# Patient Record
Sex: FEMALE | Race: WHITE | NOT HISPANIC OR LATINO | Employment: OTHER | URBAN - METROPOLITAN AREA
[De-identification: names, ages, dates, MRNs, and addresses within clinical notes are randomized per-mention and may not be internally consistent; named-entity substitution may affect disease eponyms.]

---

## 2017-03-06 ENCOUNTER — TRANSCRIBE ORDERS (OUTPATIENT)
Dept: ADMINISTRATIVE | Facility: HOSPITAL | Age: 57
End: 2017-03-06

## 2017-03-06 DIAGNOSIS — Z12.31 VISIT FOR SCREENING MAMMOGRAM: Primary | ICD-10-CM

## 2017-04-13 ENCOUNTER — HOSPITAL ENCOUNTER (OUTPATIENT)
Dept: BONE DENSITY | Facility: IMAGING CENTER | Age: 57
Discharge: HOME | End: 2017-04-13
Payer: COMMERCIAL

## 2017-04-13 DIAGNOSIS — Z12.31 VISIT FOR SCREENING MAMMOGRAM: ICD-10-CM

## 2017-04-13 PROCEDURE — G0202 SCR MAMMO BI INCL CAD: HCPCS

## 2017-08-11 ENCOUNTER — GENERIC CONVERSION - ENCOUNTER (OUTPATIENT)
Dept: OTHER | Facility: OTHER | Age: 57
End: 2017-08-11

## 2017-08-24 ENCOUNTER — GENERIC CONVERSION - ENCOUNTER (OUTPATIENT)
Dept: OTHER | Facility: OTHER | Age: 57
End: 2017-08-24

## 2017-08-25 ENCOUNTER — GENERIC CONVERSION - ENCOUNTER (OUTPATIENT)
Dept: OTHER | Facility: OTHER | Age: 57
End: 2017-08-25

## 2017-08-25 LAB
A/G RATIO (HISTORICAL): 1.5 (ref 1.2–2.2)
ALBUMIN SERPL BCP-MCNC: 4.1 G/DL (ref 3.5–5.5)
ALP SERPL-CCNC: 89 IU/L (ref 39–117)
ALT SERPL W P-5'-P-CCNC: 13 IU/L (ref 0–32)
AST SERPL W P-5'-P-CCNC: 16 IU/L (ref 0–40)
BASOPHILS # BLD AUTO: 0 X10E3/UL (ref 0–0.2)
BASOPHILS # BLD AUTO: 1 %
BILIRUB SERPL-MCNC: 0.3 MG/DL (ref 0–1.2)
BUN SERPL-MCNC: 13 MG/DL (ref 6–24)
BUN/CREA RATIO (HISTORICAL): 16 (ref 9–23)
CALCIUM SERPL-MCNC: 9.4 MG/DL (ref 8.7–10.2)
CHLORIDE SERPL-SCNC: 102 MMOL/L (ref 96–106)
CO2 SERPL-SCNC: 22 MMOL/L (ref 18–29)
CREAT SERPL-MCNC: 0.79 MG/DL (ref 0.57–1)
DEPRECATED RDW RBC AUTO: 15 % (ref 12.3–15.4)
EGFR AFRICAN AMERICAN (HISTORICAL): 97 ML/MIN/1.73
EGFR-AMERICAN CALC (HISTORICAL): 85 ML/MIN/1.73
EOSINOPHIL # BLD AUTO: 0.3 X10E3/UL (ref 0–0.4)
EOSINOPHIL # BLD AUTO: 5 %
GLUCOSE SERPL-MCNC: 103 MG/DL (ref 65–99)
HCT VFR BLD AUTO: 35 % (ref 34–46.6)
HGB BLD-MCNC: 11.3 G/DL (ref 11.1–15.9)
IMM.GRANULOCYTES (CD4/8) (HISTORICAL): 0 %
IMM.GRANULOCYTES (CD4/8) (HISTORICAL): 0 X10E3/UL (ref 0–0.1)
LYME IGG/IGM AB (HISTORICAL): <0.91 ISR (ref 0–0.9)
LYME IGM (HISTORICAL): <0.8 INDEX (ref 0–0.79)
LYMPHOCYTES # BLD AUTO: 1.5 X10E3/UL (ref 0.7–3.1)
LYMPHOCYTES # BLD AUTO: 26 %
MCH RBC QN AUTO: 28.4 PG (ref 26.6–33)
MCHC RBC AUTO-ENTMCNC: 32.3 G/DL (ref 31.5–35.7)
MCV RBC AUTO: 88 FL (ref 79–97)
MONOCYTES # BLD AUTO: 0.4 X10E3/UL (ref 0.1–0.9)
MONOCYTES (HISTORICAL): 7 %
NEUTROPHILS # BLD AUTO: 3.4 X10E3/UL (ref 1.4–7)
NEUTROPHILS # BLD AUTO: 61 %
PLATELET # BLD AUTO: 326 X10E3/UL (ref 150–379)
POTASSIUM SERPL-SCNC: 4.6 MMOL/L (ref 3.5–5.2)
RBC (HISTORICAL): 3.98 X10E6/UL (ref 3.77–5.28)
SODIUM SERPL-SCNC: 139 MMOL/L (ref 134–144)
TOT. GLOBULIN, SERUM (HISTORICAL): 2.8 G/DL (ref 1.5–4.5)
TOTAL PROTEIN (HISTORICAL): 6.9 G/DL (ref 6–8.5)
TSH SERPL DL<=0.05 MIU/L-ACNC: 0.98 UIU/ML (ref 0.45–4.5)
WBC # BLD AUTO: 5.6 X10E3/UL (ref 3.4–10.8)

## 2017-08-26 ENCOUNTER — GENERIC CONVERSION - ENCOUNTER (OUTPATIENT)
Dept: OTHER | Facility: OTHER | Age: 57
End: 2017-08-26

## 2017-11-20 ENCOUNTER — HOSPITAL ENCOUNTER (OUTPATIENT)
Dept: RADIOLOGY | Facility: HOSPITAL | Age: 57
Discharge: HOME | End: 2017-11-20
Payer: COMMERCIAL

## 2017-11-20 ENCOUNTER — TRANSCRIBE ORDERS (OUTPATIENT)
Dept: ADMINISTRATIVE | Facility: HOSPITAL | Age: 57
End: 2017-11-20

## 2017-11-20 DIAGNOSIS — M25.569 PAIN IN KNEE: ICD-10-CM

## 2017-11-20 PROCEDURE — 73562 X-RAY EXAM OF KNEE 3: CPT

## 2017-11-24 ENCOUNTER — GENERIC CONVERSION - ENCOUNTER (OUTPATIENT)
Dept: OTHER | Facility: OTHER | Age: 57
End: 2017-11-24

## 2017-12-04 ENCOUNTER — GENERIC CONVERSION - ENCOUNTER (OUTPATIENT)
Dept: FAMILY MEDICINE CLINIC | Facility: CLINIC | Age: 57
End: 2017-12-04

## 2018-01-10 NOTE — RESULT NOTES
Discussion/Summary   xrays show arthritis in both knees, more on left     Verified Results  * XR KNEE 3 VW RIGHT NON INJURY 20Nov2017 06:04PM Diya Colmenares Order Number: [id number]     Test Name Result Flag Reference   XR KNEE 3 VW RIGHT (Report)     RIGHT KNEE     INDICATION: Diffuse right knee pain  Bilateral knee pain  COMPARISON: Concurrently performed radiographs of the contralateral knee  VIEWS: 3     IMAGES: 3     FINDINGS:     There is no acute fracture or dislocation  There is no joint effusion  Mild to moderate tricompartmental right knee osteoarthritis as evidenced by joint space narrowing and osteophytosis  No lytic or blastic lesions are seen  Soft tissues are unremarkable  IMPRESSION:     No acute osseous abnormality  Mild to moderate tricompartmental right knee osteoarthritis  Osteoarthritis is slightly more severe on the left than on the right         Workstation performed: [id number]     Signed by:   Valerie Kearney MD   11/24/17

## 2018-01-10 NOTE — RESULT NOTES
Verified Results  [facility]) UA/M w/rflx Culture, Comp [id number] 12:00AM Olga Clement     Test Name Result Flag Reference   Specific Gravity 1 009  1 005-1 030   pH 6 0  5 0-7 5   Urine-Color Yellow  Yellow   Appearance Clear  Clear   WBC Esterase 2+ A Negative   Protein 1+ A Negative/Trace   Glucose Negative  Negative   Ketones Negative  Negative   Occult Blood 3+ A Negative   Bilirubin Negative  Negative   Urobilinogen,Semi-Qn 0 2 EU/dL  0 2-1 0   Nitrite, Urine Negative  Negative   Microscopic Examination See below:     Urinalysis Reflex Comment     This specimen has reflexed to a Urine Culture  WBC 11-30 /hpf A 0 -  5   RBC 11-30 /hpf A 0 -  2   Epithelial Cells (non renal) 0-10 /hpf  0 - 10   Mucus Threads Present  Not Estab     Bacteria Few  None seen/Few   Result 1 Escherichia coli A    25,000-50,000 colony forming units per mL   Urine Culture,Comprehensive Final report A    Antimicrobial Susceptibility Comment     ** S = Susceptible; I = Intermediate; R = Resistant **                     P = Positive; N = Negative              MICS are expressed in micrograms per mL     Antibiotic                 RSLT#1    RSLT#2    RSLT#3    RSLT#4  Amoxicillin/Clavulanic Acid    S  Ampicillin                     S  Cefepime                       S  Ceftriaxone                    S  Cefuroxime                     S  Cephalothin                    S  Ciprofloxacin                  S  Ertapenem                      S  Gentamicin                     S  Imipenem                       S  Levofloxacin                   S  Nitrofurantoin                 S  Piperacillin                   S  Tetracycline                   S  Tobramycin                     S  Trimethoprim/Sulfa             S

## 2018-01-11 NOTE — RESULT NOTES
Discussion/Summary   labs are normal but sugar is slightly high  watch carbs     Verified Results  (1) CBC/PLT/DIFF [id number] 12:00AM Mary Conyac     Test Name Result Flag Reference   WBC 5 6 x10E3/uL  3 4-10 8   RBC 3 98 x10E6/uL  3 77-5 28   Hemoglobin 11 3 g/dL  11 1-15 9   Hematocrit 35 0 %  34 0-46  6   MCV 88 fL  79-97   MCH 28 4 pg  26 6-33 0   MCHC 32 3 g/dL  31 5-35 7   RDW 15 0 %  12 3-15 4   Platelets 380 X07I6/HV  150-379   Neutrophils 61 %     Lymphs 26 %     Monocytes 7 %     Eos 5 %     Basos 1 %     Neutrophils (Absolute) 3 4 x10E3/uL  1 4-7 0   Lymphs (Absolute) 1 5 x10E3/uL  0 7-3 1   Monocytes(Absolute) 0 4 x10E3/uL  0 1-0 9   Eos (Absolute) 0 3 x10E3/uL  0 0-0 4   Baso (Absolute) 0 0 x10E3/uL  0 0-0 2   Immature Granulocytes 0 %     Immature Grans (Abs) 0 0 x10E3/uL  0 0-0 1     (1) COMPREHENSIVE METABOLIC PANEL [id number] 52:20RZ MaryFritter     Test Name Result Flag Reference   Glucose, Serum 103 mg/dL H 65-99   BUN 13 mg/dL  6-24   Creatinine, Serum 0 79 mg/dL  0 57-1 00   BUN/Creatinine Ratio 16  9-23   Sodium, Serum 139 mmol/L  134-144   Potassium, Serum 4 6 mmol/L  3 5-5 2   Chloride, Serum 102 mmol/L     Carbon Dioxide, Total 22 mmol/L  18-29   Calcium, Serum 9 4 mg/dL  8 7-10 2   Protein, Total, Serum 6 9 g/dL  6 0-8 5   Albumin, Serum 4 1 g/dL  3 5-5 5   Globulin, Total 2 8 g/dL  1 5-4 5   A/G Ratio 1 5  1 2-2 2   Bilirubin, Total 0 3 mg/dL  0 0-1 2   Alkaline Phosphatase, S 89 IU/L     AST (SGOT) 16 IU/L  0-40   ALT (SGPT) 13 IU/L  0-32   eGFR If NonAfricn Am 85 mL/min/1 73  >59   eGFR If Africn Am 97 mL/min/1 73  >59     (1) TSH [id number] 12:00AM Mary Romero     Test Name Result Flag Reference   TSH 0 979 uIU/mL  0 450-4 500

## 2018-01-16 NOTE — RESULT NOTES
Discussion/Summary   lyme is negative     Verified Results  (LC) Lyme Ab/Western Blot Reflex [id number] 12:00AM Lia Hope     Test Name Result Flag Reference   Lyme IgG/IgM Ab <0 91 ISR  0 00-0 90   Negative         <0 91                                                 Equivocal  0 91 - 1 09                                                 Positive         >1 09   Lyme Disease Ab, Quant, IgM <0 80 index  0 00-0 79   Negative         <0 80                                                 Equivocal  0 80 - 1 19                                                 Positive         >1 19                  IgM levels may peak at 3-6 weeks post infection, then                  gradually decline

## 2018-01-18 NOTE — RESULT NOTES
Message   please check on blood work, are they running labs     Verified Results  [facility]) Please note [id number] 12:00AM Dorthea Batch     Test Name Result Flag Reference   Please note Comment     The date and/or time of collection was not indicated on the  requisition as required by state and federal law  The date of  receipt of the specimen was used as the collection date if not  supplied

## 2018-03-15 ENCOUNTER — TRANSCRIBE ORDERS (OUTPATIENT)
Dept: ADMINISTRATIVE | Facility: HOSPITAL | Age: 58
End: 2018-03-15

## 2018-03-15 DIAGNOSIS — Z12.39 SCREENING BREAST EXAMINATION: Primary | ICD-10-CM

## 2018-04-16 ENCOUNTER — HOSPITAL ENCOUNTER (OUTPATIENT)
Dept: BONE DENSITY | Facility: IMAGING CENTER | Age: 58
Discharge: HOME | End: 2018-04-16
Payer: COMMERCIAL

## 2018-04-16 DIAGNOSIS — Z12.39 SCREENING BREAST EXAMINATION: ICD-10-CM

## 2018-04-16 PROCEDURE — 77067 SCR MAMMO BI INCL CAD: CPT

## 2019-03-27 ENCOUNTER — TRANSCRIBE ORDERS (OUTPATIENT)
Dept: ADMINISTRATIVE | Facility: HOSPITAL | Age: 59
End: 2019-03-27

## 2019-03-27 DIAGNOSIS — Z12.39 SCREENING BREAST EXAMINATION: Primary | ICD-10-CM

## 2019-04-18 ENCOUNTER — HOSPITAL ENCOUNTER (OUTPATIENT)
Dept: BONE DENSITY | Facility: IMAGING CENTER | Age: 59
Discharge: HOME | End: 2019-04-18
Payer: COMMERCIAL

## 2019-04-18 VITALS — BODY MASS INDEX: 24.88 KG/M2 | WEIGHT: 168 LBS | HEIGHT: 69 IN

## 2019-04-18 DIAGNOSIS — Z12.39 SCREENING BREAST EXAMINATION: ICD-10-CM

## 2019-04-18 PROCEDURE — 77063 BREAST TOMOSYNTHESIS BI: CPT

## 2019-04-18 PROCEDURE — 77067 SCR MAMMO BI INCL CAD: CPT

## 2020-03-10 DIAGNOSIS — Z13.220 ENCOUNTER FOR LIPID SCREENING FOR CARDIOVASCULAR DISEASE: Primary | ICD-10-CM

## 2020-03-10 DIAGNOSIS — Z13.29 SCREENING FOR ENDOCRINE DISORDER: ICD-10-CM

## 2020-03-10 DIAGNOSIS — Z13.0 SCREENING FOR IRON DEFICIENCY ANEMIA: ICD-10-CM

## 2020-03-10 DIAGNOSIS — Z13.6 ENCOUNTER FOR LIPID SCREENING FOR CARDIOVASCULAR DISEASE: Primary | ICD-10-CM

## 2020-03-13 LAB
ALBUMIN SERPL-MCNC: 4.4 G/DL (ref 3.8–4.9)
ALBUMIN/GLOB SERPL: 1.8 {RATIO} (ref 1.2–2.2)
ALP SERPL-CCNC: 83 IU/L (ref 39–117)
ALT SERPL-CCNC: 15 IU/L (ref 0–32)
AST SERPL-CCNC: 19 IU/L (ref 0–40)
BASOPHILS # BLD AUTO: 0.1 X10E3/UL (ref 0–0.2)
BASOPHILS NFR BLD AUTO: 2 %
BILIRUB SERPL-MCNC: 0.3 MG/DL (ref 0–1.2)
BUN SERPL-MCNC: 12 MG/DL (ref 6–24)
BUN/CREAT SERPL: 14 (ref 9–23)
CALCIUM SERPL-MCNC: 9.3 MG/DL (ref 8.7–10.2)
CHLORIDE SERPL-SCNC: 104 MMOL/L (ref 96–106)
CHOLEST SERPL-MCNC: 187 MG/DL (ref 100–199)
CO2 SERPL-SCNC: 24 MMOL/L (ref 20–29)
CREAT SERPL-MCNC: 0.83 MG/DL (ref 0.57–1)
EOSINOPHIL # BLD AUTO: 0.4 X10E3/UL (ref 0–0.4)
EOSINOPHIL NFR BLD AUTO: 7 %
ERYTHROCYTE [DISTWIDTH] IN BLOOD BY AUTOMATED COUNT: 14 % (ref 11.7–15.4)
GLOBULIN SER-MCNC: 2.4 G/DL (ref 1.5–4.5)
GLUCOSE SERPL-MCNC: 96 MG/DL (ref 65–99)
HCT VFR BLD AUTO: 39.4 % (ref 34–46.6)
HDLC SERPL-MCNC: 81 MG/DL
HGB BLD-MCNC: 13.4 G/DL (ref 11.1–15.9)
IMM GRANULOCYTES # BLD: 0 X10E3/UL (ref 0–0.1)
IMM GRANULOCYTES NFR BLD: 0 %
LDLC SERPL CALC-MCNC: 86 MG/DL (ref 0–99)
LDLC/HDLC SERPL: 1.1 RATIO (ref 0–3.2)
LYMPHOCYTES # BLD AUTO: 1.9 X10E3/UL (ref 0.7–3.1)
LYMPHOCYTES NFR BLD AUTO: 35 %
MCH RBC QN AUTO: 30 PG (ref 26.6–33)
MCHC RBC AUTO-ENTMCNC: 34 G/DL (ref 31.5–35.7)
MCV RBC AUTO: 88 FL (ref 79–97)
MONOCYTES # BLD AUTO: 0.4 X10E3/UL (ref 0.1–0.9)
MONOCYTES NFR BLD AUTO: 7 %
NEUTROPHILS # BLD AUTO: 2.7 X10E3/UL (ref 1.4–7)
NEUTROPHILS NFR BLD AUTO: 49 %
PLATELET # BLD AUTO: 314 X10E3/UL (ref 150–450)
POTASSIUM SERPL-SCNC: 4.4 MMOL/L (ref 3.5–5.2)
PROT SERPL-MCNC: 6.8 G/DL (ref 6–8.5)
RBC # BLD AUTO: 4.47 X10E6/UL (ref 3.77–5.28)
SL AMB EGFR AFRICAN AMERICAN: 91 ML/MIN/1.73
SL AMB EGFR NON AFRICAN AMERICAN: 79 ML/MIN/1.73
SL AMB VLDL CHOLESTEROL CALC: 20 MG/DL (ref 5–40)
SODIUM SERPL-SCNC: 141 MMOL/L (ref 134–144)
TRIGL SERPL-MCNC: 98 MG/DL (ref 0–149)
TSH SERPL DL<=0.005 MIU/L-ACNC: 0.8 UIU/ML (ref 0.45–4.5)
WBC # BLD AUTO: 5.4 X10E3/UL (ref 3.4–10.8)

## 2020-03-16 ENCOUNTER — OFFICE VISIT (OUTPATIENT)
Dept: FAMILY MEDICINE CLINIC | Facility: CLINIC | Age: 60
End: 2020-03-16
Payer: COMMERCIAL

## 2020-03-16 VITALS
TEMPERATURE: 97.8 F | RESPIRATION RATE: 16 BRPM | DIASTOLIC BLOOD PRESSURE: 75 MMHG | SYSTOLIC BLOOD PRESSURE: 105 MMHG | OXYGEN SATURATION: 98 % | HEIGHT: 69 IN | HEART RATE: 76 BPM | WEIGHT: 170 LBS | BODY MASS INDEX: 25.18 KG/M2

## 2020-03-16 DIAGNOSIS — Z12.39 SCREENING FOR BREAST CANCER: ICD-10-CM

## 2020-03-16 DIAGNOSIS — B35.4 TINEA CORPORIS: ICD-10-CM

## 2020-03-16 DIAGNOSIS — Z00.00 WELL ADULT EXAM: Primary | ICD-10-CM

## 2020-03-16 DIAGNOSIS — Z12.11 COLON CANCER SCREENING: ICD-10-CM

## 2020-03-16 PROBLEM — M17.9 OSTEOARTHRITIS OF KNEE: Status: ACTIVE | Noted: 2020-03-16

## 2020-03-16 PROBLEM — Z90.710 HX OF TOTAL HYSTERECTOMY: Status: ACTIVE | Noted: 2020-03-16

## 2020-03-16 PROBLEM — M17.10 OSTEOARTHRITIS OF KNEE: Status: ACTIVE | Noted: 2020-03-16

## 2020-03-16 PROBLEM — A69.20 LYME DISEASE: Status: ACTIVE | Noted: 2017-08-24

## 2020-03-16 PROCEDURE — 99386 PREV VISIT NEW AGE 40-64: CPT | Performed by: FAMILY MEDICINE

## 2020-03-16 PROCEDURE — 3008F BODY MASS INDEX DOCD: CPT | Performed by: FAMILY MEDICINE

## 2020-03-16 RX ORDER — NYSTATIN 100000 U/G
CREAM TOPICAL 2 TIMES DAILY
Qty: 30 G | Refills: 0 | Status: SHIPPED | OUTPATIENT
Start: 2020-03-16

## 2020-03-16 NOTE — PROGRESS NOTES
Jeremiah Albarado is a 64 y o   female and is here for routine health maintenance  The patient reports no problems  History of Present Illness     Patient is here for physical today  She denies any complaints or changes in her medical history since our last office visit about 4 years ago  She denies any recent illnesses  Her bowel movements are normal   Her last colonoscopy was 7 years ago and she is due for recheck  She had a gyn visit and was given a prescription for her mammogram   She is on no medicines on a regular basis  She is exercising 3 days a week, spinning and has helped with her knee pain  She also does weight training 2 days a week  She denies any chest pains or shortness of breath  She is eating healthy  Well Adult Physical   Patient here for a comprehensive physical exam       Diet and Physical Activity  Diet: well balanced diet  Weight concerns: Patient is overweight (BMI 25 0-29  9)  Exercise: frequently      Depression Screen  PHQ-9 Depression Screening    PHQ-9:    Frequency of the following problems over the past two weeks:       Little interest or pleasure in doing things:  0 - not at all  Feeling down, depressed, or hopeless:  0 - not at all  PHQ-2 Score:  0          General Health  Hearing: Normal:  bilateral  Vision: no vision problems  Dental: regular dental visits     History:  LMP: No LMP recorded  Patient has had a hysterectomy        Cancer Screening  Colononoscopy up to date but due 4/2020  Mammogram due 4/2020  Pap N/A history of hysterectomy   Abnormal pap? no  Smoker NO Annual screening with low-dose helical computed tomography (CT) for patients age 54 to 76 years with history of smoking at least 30 pack-years and, if a former smoker, had quit within the previous 15 years      The following portions of the patient's history were reviewed and updated as appropriate: allergies, current medications, past family history, past medical history, past social history, past surgical history and problem list     Review of Systems     Review of Systems   Constitutional: Negative  Negative for fatigue and fever  HENT: Negative  Eyes: Negative  Respiratory: Negative  Negative for cough  Cardiovascular: Negative  Gastrointestinal: Negative  Endocrine: Negative  Genitourinary: Negative  Musculoskeletal: Negative  Skin: Positive for rash  Rash left ankle for a few months    Allergic/Immunologic: Negative  Neurological: Negative  Psychiatric/Behavioral: Negative  Past Medical History     History reviewed  No pertinent past medical history      Past Surgical History     Past Surgical History:   Procedure Laterality Date    BREAST EXCISIONAL BIOPSY Left 1993    benign    HYSTERECTOMY      age 50    OOPHORECTOMY Bilateral     with hysterectomy, age 48       Social History     Social History     Socioeconomic History    Marital status: /Civil Union     Spouse name: None    Number of children: None    Years of education: None    Highest education level: None   Occupational History    None   Social Needs    Financial resource strain: None    Food insecurity:     Worry: None     Inability: None    Transportation needs:     Medical: None     Non-medical: None   Tobacco Use    Smoking status: Former Smoker    Smokeless tobacco: Never Used   Substance and Sexual Activity    Alcohol use: None    Drug use: None    Sexual activity: None   Lifestyle    Physical activity:     Days per week: None     Minutes per session: None    Stress: None   Relationships    Social connections:     Talks on phone: None     Gets together: None     Attends Evangelical service: None     Active member of club or organization: None     Attends meetings of clubs or organizations: None     Relationship status: None    Intimate partner violence:     Fear of current or ex partner: None     Emotionally abused: None     Physically abused: None Forced sexual activity: None   Other Topics Concern    None   Social History Narrative    None       Family History     Family History   Problem Relation Age of Onset    Endometrial cancer Paternal Grandmother         age unknown       Current Medications       Current Outpatient Medications:     nystatin (MYCOSTATIN) cream, Apply topically 2 (two) times a day, Disp: 30 g, Rfl: 0     Allergies     No Known Allergies    Objective     /75   Pulse 76   Temp 97 8 °F (36 6 °C)   Resp 16   Ht 5' 9" (1 753 m)   Wt 77 1 kg (170 lb)   SpO2 98%   BMI 25 10 kg/m²      Physical Exam   Constitutional: She is oriented to person, place, and time  She appears well-developed and well-nourished  HENT:   Head: Normocephalic  Right Ear: External ear normal    Left Ear: External ear normal    Nose: Nose normal    Mouth/Throat: Oropharynx is clear and moist    Eyes: Pupils are equal, round, and reactive to light  Conjunctivae are normal    Neck: Normal range of motion  Neck supple  Cardiovascular: Normal rate, regular rhythm, normal heart sounds and intact distal pulses  Pulmonary/Chest: Effort normal and breath sounds normal    Abdominal: Soft  Bowel sounds are normal    Neurological: She is alert and oriented to person, place, and time  Skin: Skin is warm and dry  Rash noted  Left ankle: raised erythematous area 1 5cm, raised border  Psychiatric: She has a normal mood and affect  Her behavior is normal  Judgment and thought content normal    Nursing note and vitals reviewed  BMI Counseling: Body mass index is 25 1 kg/m²  The BMI is above normal  Nutrition recommendations include reducing portion sizes  Exercise recommendations include exercising 3-5 times per week    No exam data present    Health Maintenance     Health Maintenance   Topic Date Due    Hepatitis C Screening  1960    Depression Screening PHQ  1960    HIV Screening  06/07/1977    BMI: Followup Plan  06/07/1980    Annual Physical  06/07/1980    Influenza Vaccine  07/01/2019    MAMMOGRAM  04/18/2020    CRC Screening: Colonoscopy  04/22/2020    BMI: Adult  03/16/2021    DTaP,Tdap,and Td Vaccines (2 - Td) 08/11/2021    Pneumococcal Vaccine: 65+ Years (1 of 2 - PCV13) 06/07/2027    Pneumococcal Vaccine: Pediatrics (0 to 5 Years) and At-Risk Patients (6 to 59 Years)  Aged Out    HIB Vaccine  Aged Out    Hepatitis B Vaccine  Aged Out    IPV Vaccine  Aged Out    Hepatitis A Vaccine  Aged Out    Meningococcal ACWY Vaccine  Aged Out    HPV Vaccine  Aged [employer] History   Administered Date(s) Administered    Tdap 08/11/2011       Assessment/Plan         1  Healthy female exam   2  Patient Counseling:   · Nutrition: Stressed importance of a well balanced diet, moderation of sodium/saturated fat, caloric balance and sufficient intake of fiber  · Exercise: Stressed the importance of regular exercise with a goal of 150 minutes per week  · Dental Health: Discussed daily flossing and brushing and regular dental visits     · Immunizations reviewed  · Discussed benefits of screening   · Discussed the patient's BMI with her  The BMI is above average; BMI management plan is completed  3  Cancer Screening   4  Labs   5    6  Follow up in one year      Gianluca Estrella DO

## 2020-03-16 NOTE — PATIENT INSTRUCTIONS
Schedule mammogram in April   Schedule colonoscopy in April   Nystatin cream apply twice a day,     Wellness Visit for Adults   AMBULATORY CARE:   A wellness visit  is when you see your healthcare provider to get screened for health problems  You can also get advice on how to stay healthy  Write down your questions so you remember to ask them  Ask your healthcare provider how often you should have a wellness visit  What happens at a wellness visit:  Your healthcare provider will ask about your health, and your family history of health problems  This includes high blood pressure, heart disease, and cancer  He or she will ask if you have symptoms that concern you, if you smoke, and about your mood  You may also be asked about your intake of medicines, supplements, food, and alcohol  Any of the following may be done:  · Your weight  will be checked  Your height may also be checked so your body mass index (BMI) can be calculated  Your BMI shows if you are at a healthy weight  · Your blood pressure  and heart rate will be checked  Your temperature may also be checked  · Blood and urine tests  may be done  Blood tests may be done to check your cholesterol levels  Abnormal cholesterol levels increase your risk for heart disease and stroke  You may also need a blood or urine test to check for diabetes if you are at increased risk  Urine tests may be done to look for signs of an infection or kidney disease  · A physical exam  includes checking your heartbeat and lungs with a stethoscope  Your healthcare provider may also check your skin to look for sun damage  · Screening tests  may be recommended  A screening test is done to check for diseases that may not cause symptoms  The screening tests you may need depend on your age, gender, family history, and lifestyle habits  For example, colorectal screening may be recommended if you are 48years old or older    Screening tests you need if you are a woman:   · A Pap smear  is used to screen for cervical cancer  Pap smears are usually done every 3 to 5 years depending on your age  You may need them more often if you have had abnormal Pap smear test results in the past  Ask your healthcare provider how often you should have a Pap smear  · A mammogram  is an x-ray of your breasts to screen for breast cancer  Experts recommend mammograms every 2 years starting at age 48 years  You may need a mammogram at age 52 years or younger if you have an increased risk for breast cancer  Talk to your healthcare provider about when you should start having mammograms and how often you need them  Vaccines you may need:   · Get an influenza vaccine  every year  The influenza vaccine protects you from the flu  Several types of viruses cause the flu  The viruses change over time, so new vaccines are made each year  · Get a tetanus-diphtheria (Td) booster vaccine  every 10 years  This vaccine protects you against tetanus and diphtheria  Tetanus is a severe infection that may cause painful muscle spasms and lockjaw  Diphtheria is a severe bacterial infection that causes a thick covering in the back of your mouth and throat  · Get a human papillomavirus (HPV) vaccine  if you are female and aged 23 to 32 or male 23 to 24 and never received it  This vaccine protects you from HPV infection  HPV is the most common infection spread by sexual contact  HPV may also cause vaginal, penile, and anal cancers  · Get a pneumococcal vaccine  if you are aged 72 years or older  The pneumococcal vaccine is an injection given to protect you from pneumococcal disease  Pneumococcal disease is an infection caused by pneumococcal bacteria  The infection may cause pneumonia, meningitis, or an ear infection  · Get a shingles vaccine  if you are aged 61 or older, even if you have had shingles before  The shingles vaccine is an injection to protect you from the varicella-zoster virus   This is the same virus that causes chickenpox  Shingles is a painful rash that develops in people who had chickenpox or have been exposed to the virus  How to eat healthy:  My Plate is a model for planning healthy meals  It shows the types and amounts of foods that should go on your plate  Fruits and vegetables make up about half of your plate, and grains and protein make up the other half  A serving of dairy is included on the side of your plate  The amount of calories and serving sizes you need depends on your age, gender, weight, and height  Examples of healthy foods are listed below:  · Eat a variety of vegetables  such as dark green, red, and orange vegetables  You can also include canned vegetables low in sodium (salt) and frozen vegetables without added butter or sauces  · Eat a variety of fresh fruits , canned fruit in 100% juice, frozen fruit, and dried fruit  · Include whole grains  At least half of the grains you eat should be whole grains  Examples include whole-wheat bread, wheat pasta, brown rice, and whole-grain cereals such as oatmeal     · Eat a variety of protein foods such as seafood (fish and shellfish), lean meat, and poultry without skin (turkey and chicken)  Examples of lean meats include pork leg, shoulder, or tenderloin, and beef round, sirloin, tenderloin, and extra lean ground beef  Other protein foods include eggs and egg substitutes, beans, peas, soy products, nuts, and seeds  · Choose low-fat dairy products such as skim or 1% milk or low-fat yogurt, cheese, and cottage cheese  · Limit unhealthy fats  such as butter, hard margarine, and shortening  Exercise:  Exercise at least 30 minutes per day on most days of the week  Some examples of exercise include walking, biking, dancing, and swimming  You can also fit in more physical activity by taking the stairs instead of the elevator or parking farther away from stores  Include muscle strengthening activities 2 days each week   Regular exercise provides many health benefits  It helps you manage your weight, and decreases your risk for type 2 diabetes, heart disease, stroke, and high blood pressure  Exercise can also help improve your mood  Ask your healthcare provider about the best exercise plan for you  General health and safety guidelines:   · Do not smoke  Nicotine and other chemicals in cigarettes and cigars can cause lung damage  Ask your healthcare provider for information if you currently smoke and need help to quit  E-cigarettes or smokeless tobacco still contain nicotine  Talk to your healthcare provider before you use these products  · Limit alcohol  A drink of alcohol is 12 ounces of beer, 5 ounces of wine, or 1½ ounces of liquor  · Lose weight, if needed  Being overweight increases your risk of certain health conditions  These include heart disease, high blood pressure, type 2 diabetes, and certain types of cancer  · Protect your skin  Do not sunbathe or use tanning beds  Use sunscreen with a SPF 15 or higher  Apply sunscreen at least 15 minutes before you go outside  Reapply sunscreen every 2 hours  Wear protective clothing, hats, and sunglasses when you are outside  · Drive safely  Always wear your seatbelt  Make sure everyone in your car wears a seatbelt  A seatbelt can save your life if you are in an accident  Do not use your cell phone when you are driving  This could distract you and cause an accident  Pull over if you need to make a call or send a text message  · Practice safe sex  Use latex condoms if are sexually active and have more than one partner  Your healthcare provider may recommend screening tests for sexually transmitted infections (STIs)  · Wear helmets, lifejackets, and protective gear  Always wear a helmet when you ride a bike or motorcycle, go skiing, or play sports that could cause a head injury  Wear protective equipment when you play sports   Wear a lifejacket when you are on a boat or doing water sports  © 2017 [facility] Information is for End User's use only and may not be sold, redistributed or otherwise used for commercial purposes  All illustrations and images included in CareNotes® are the copyrighted property of A D A M [employer] Inc  or Kevan Linares  The above information is an  only  It is not intended as medical advice for individual conditions or treatments  Talk to your doctor, nurse or pharmacist before following any medical regimen to see if it is safe and effective for you

## 2020-04-23 ENCOUNTER — TRANSCRIBE ORDERS (OUTPATIENT)
Dept: ADMINISTRATIVE | Facility: HOSPITAL | Age: 60
End: 2020-04-23

## 2020-04-23 DIAGNOSIS — Z12.31 ENCOUNTER FOR SCREENING MAMMOGRAM FOR MALIGNANT NEOPLASM OF BREAST: Primary | ICD-10-CM

## 2020-05-21 ENCOUNTER — HOSPITAL ENCOUNTER (OUTPATIENT)
Dept: BONE DENSITY | Facility: IMAGING CENTER | Age: 60
Discharge: HOME | End: 2020-05-21
Payer: COMMERCIAL

## 2020-05-21 VITALS — HEIGHT: 69 IN | BODY MASS INDEX: 25.18 KG/M2 | WEIGHT: 170 LBS

## 2020-05-21 DIAGNOSIS — Z12.31 ENCOUNTER FOR SCREENING MAMMOGRAM FOR MALIGNANT NEOPLASM OF BREAST: ICD-10-CM

## 2020-05-21 PROCEDURE — 77067 SCR MAMMO BI INCL CAD: CPT

## 2020-05-21 PROCEDURE — 77063 BREAST TOMOSYNTHESIS BI: CPT

## 2020-06-02 LAB — EXT SARS-COV-2: NOT DETECTED

## 2021-04-26 ENCOUNTER — OFFICE VISIT (OUTPATIENT)
Dept: FAMILY MEDICINE CLINIC | Facility: CLINIC | Age: 61
End: 2021-04-26
Payer: COMMERCIAL

## 2021-04-26 ENCOUNTER — HOSPITAL ENCOUNTER (OUTPATIENT)
Dept: RADIOLOGY | Facility: HOSPITAL | Age: 61
Discharge: HOME | End: 2021-04-26
Payer: COMMERCIAL

## 2021-04-26 VITALS
WEIGHT: 172 LBS | TEMPERATURE: 97.5 F | BODY MASS INDEX: 25.48 KG/M2 | SYSTOLIC BLOOD PRESSURE: 114 MMHG | OXYGEN SATURATION: 99 % | HEIGHT: 69 IN | HEART RATE: 107 BPM | DIASTOLIC BLOOD PRESSURE: 78 MMHG

## 2021-04-26 DIAGNOSIS — Z00.00 WELL ADULT EXAM: Primary | ICD-10-CM

## 2021-04-26 DIAGNOSIS — M25.462 EFFUSION OF LEFT KNEE: ICD-10-CM

## 2021-04-26 DIAGNOSIS — Z13.29 SCREENING FOR ENDOCRINE DISORDER: ICD-10-CM

## 2021-04-26 DIAGNOSIS — Z13.220 ENCOUNTER FOR LIPID SCREENING FOR CARDIOVASCULAR DISEASE: ICD-10-CM

## 2021-04-26 DIAGNOSIS — Z13.6 ENCOUNTER FOR LIPID SCREENING FOR CARDIOVASCULAR DISEASE: ICD-10-CM

## 2021-04-26 DIAGNOSIS — M79.672 LEFT FOOT PAIN: ICD-10-CM

## 2021-04-26 DIAGNOSIS — Z13.0 SCREENING FOR IRON DEFICIENCY ANEMIA: ICD-10-CM

## 2021-04-26 DIAGNOSIS — Z13.29 SCREENING FOR THYROID DISORDER: ICD-10-CM

## 2021-04-26 DIAGNOSIS — Z11.59 NEED FOR HEPATITIS C SCREENING TEST: ICD-10-CM

## 2021-04-26 DIAGNOSIS — Z12.31 ENCOUNTER FOR SCREENING MAMMOGRAM FOR MALIGNANT NEOPLASM OF BREAST: ICD-10-CM
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
## 2021-04-26 PROCEDURE — 73630 X-RAY EXAM OF FOOT: CPT

## 2021-04-26 PROCEDURE — 99213 OFFICE O/P EST LOW 20 MIN: CPT | Performed by: FAMILY MEDICINE

## 2021-04-26 PROCEDURE — 3725F SCREEN DEPRESSION PERFORMED: CPT | Performed by: FAMILY MEDICINE

## 2021-04-26 PROCEDURE — 1036F TOBACCO NON-USER: CPT | Performed by: FAMILY MEDICINE

## 2021-04-26 PROCEDURE — 3008F BODY MASS INDEX DOCD: CPT | Performed by: FAMILY MEDICINE

## 2021-04-26 PROCEDURE — 99396 PREV VISIT EST AGE 40-64: CPT | Performed by: FAMILY MEDICINE

## 2021-04-26 NOTE — PROGRESS NOTES
Assessment/Plan:      1  Well adult exam    2  Injury due to fall, initial encounter  -     XR foot 3+ vw left; Future; Expected date: 04/26/2021    3  Left foot pain  Assessment & Plan:  Xray left foot, r/o fracture distal 4th and 5th metatarsal   If no fracture, rest, cool compresses, elevate, compression knee hi, call if ongoing symptoms    Orders:  -     XR foot 3+ vw left; Future; Expected date: 04/26/2021    4  Effusion of left knee  Assessment & Plan:  Cool compresses, ace wrap, call if unstable      5  Encounter for lipid screening for cardiovascular disease  -     Lipid Panel with Direct LDL reflex; Future    6  Screening for endocrine disorder  -     Comprehensive metabolic panel; Future    7  Screening for iron deficiency anemia  -     CBC and differential; Future    8  Screening for thyroid disorder  -     TSH, 3rd generation with Free T4 reflex; Future    9  Need for hepatitis C screening test  -     Hepatitis C Antibody ([facility]); Future    10  Encounter for screening mammogram for malignant neoplasm of breast        Subjective:  Chief Complaint   Patient presents with    Fall     pt states she fell on Sat, and she hurt her L foot black and blue on top, and her L knee is swelling    Physical Exam     physical done today  bw order placed  Patient ID: Venecia Rouse is a 64 y o  female  Pt states she was exercising- spin class in the am and gardening outside  Then had Leg cramps bilaterally  Fredirick Bakes Directly on left knee and left foot  Saturday 4/24, 2 days ago  Fluids  Took Advil, keeping off of it  Could not walk on it initially  Now she Can walk on it  2 knee surgeries in the past        Review of Systems   Constitutional: Negative  Negative for fatigue and fever  HENT: Negative  Eyes: Negative  Respiratory: Negative  Negative for cough  Cardiovascular: Negative  Gastrointestinal: Negative  Endocrine: Negative  Genitourinary: Negative  Musculoskeletal: Positive for arthralgias  Skin: Negative  Allergic/Immunologic: Negative  Neurological: Negative  Psychiatric/Behavioral: Negative  The following portions of the patient's history were reviewed and updated as appropriate: allergies, current medications, past family history, past medical history, past social history, past surgical history and problem list     Objective:  Vitals:    04/26/21 1506   BP: 114/78   Pulse: (!) 107   Temp: 97 5 °F (36 4 °C)   SpO2: 99%   Weight: 78 kg (172 lb)   Height: 5' 9" (1 753 m)      Physical Exam  Vitals signs and nursing note reviewed  Constitutional:       Appearance: She is well-developed  HENT:      Head: Normocephalic and atraumatic  Cardiovascular:      Rate and Rhythm: Normal rate and regular rhythm  Heart sounds: Normal heart sounds  Pulmonary:      Effort: Pulmonary effort is normal       Breath sounds: Normal breath sounds  Abdominal:      General: Bowel sounds are normal       Palpations: Abdomen is soft  Musculoskeletal:         General: Swelling, tenderness and signs of injury present  Comments: Left knee mild effusion, no joint line tenderness, ligaments intact, good rom  Left foot, pain over distal 4th and 5th metatarsal with ecchymosis  Swelling but no obvious deformity  No pain over lateral malleoli or proximal 5th metatarsal   Skin:     General: Skin is warm and dry  Findings: Bruising present  Comments: Bruising over distal foot , distal 4th and 5th metatarsal   Neurological:      Mental Status: She is alert and oriented to person, place, and time  Psychiatric:         Behavior: Behavior normal          Thought Content:  Thought content normal          Judgment: Judgment normal

## 2021-04-26 NOTE — PATIENT INSTRUCTIONS
Xray left foot   Ibuprofen in am 1-3 tabs of 200mg with food  Cool compresses  Ace wrap knee  Compression stockings   Electrolyte water

## 2021-04-27 ENCOUNTER — TELEPHONE (OUTPATIENT)
Dept: ADMINISTRATIVE | Facility: OTHER | Age: 61
End: 2021-04-27

## 2021-04-27 NOTE — LETTER
Procedure Request Form: Colonoscopy      Date Requested: 21  Patient: Jackson Stagger  Patient : 1960   Referring Provider: Percy Mcmanus, DO        Date of Procedure ______________________________       The above patient has informed us that they have completed their   most recent Colonoscopy at your facility  Please complete   this form and attach all corresponding procedure reports/results  Comments __________________________________________________________  ____________________________________________________________________  ____________________________________________________________________  ____________________________________________________________________    Facility Completing Procedure _________________________________________    Form Completed By (print name) _______________________________________      Signature __________________________________________________________      These reports are needed for  compliance    Please fax this completed form and a copy of the procedure report to our office located at [place] [address] as soon as possible to [phone] attention Zainab Orantes: Phone [phone]    We thank you for your assistance in treating our mutual patient       [facility] [phone] F [phone]

## 2021-04-27 NOTE — TELEPHONE ENCOUNTER
Upon review of the In Basket request and the patient's chart, initial outreach has been made via fax, please see Contacts section for details       Thank you  Bethel Almaguer

## 2021-04-27 NOTE — TELEPHONE ENCOUNTER
----- Message from Aleena Mcnamara sent at 4/26/2021  4:26 PM EDT -----  Regarding: colonoscopy  Pt states she had her colonoscopy done last year on June, at [facility]  Pt states she don't who was the doctor  Please update pt chart       Thanks

## 2021-04-27 NOTE — LETTER
Procedure Request Form: Colonoscopy      Date Requested: 21  Patient: Leatha Ream  Patient : 1960   Referring Provider: Nito Sanderson, DO        Date of Procedure ______________________________       The above patient has informed us that they have completed their   most recent Colonoscopy at your facility  Please complete   this form and attach all corresponding procedure reports/results  Comments __________________________________________________________  ____________________________________________________________________  ____________________________________________________________________  ____________________________________________________________________    Facility Completing Procedure _________________________________________    Form Completed By (print name) _______________________________________      Signature __________________________________________________________      These reports are needed for  compliance    Please fax this completed form and a copy of the procedure report to our office located at [place] [address] as soon as possible to [phone] esther Corbett: Phone [phone]    We thank you for your assistance in treating our mutual patient     [facility] [phone] F [phone]

## 2021-04-29 PROBLEM — Z13.29 SCREENING FOR ENDOCRINE DISORDER: Status: ACTIVE | Noted: 2021-04-29

## 2021-04-29 PROBLEM — Z11.59 NEED FOR HEPATITIS C SCREENING TEST: Status: ACTIVE | Noted: 2021-04-29

## 2021-04-29 PROBLEM — M25.462 EFFUSION OF LEFT KNEE: Status: ACTIVE | Noted: 2021-04-29

## 2021-04-29 PROBLEM — Z13.220 ENCOUNTER FOR LIPID SCREENING FOR CARDIOVASCULAR DISEASE: Status: ACTIVE | Noted: 2021-04-29

## 2021-04-29 PROBLEM — Z13.0 SCREENING FOR IRON DEFICIENCY ANEMIA: Status: ACTIVE | Noted: 2021-04-29

## 2021-04-29 PROBLEM — Z13.6 ENCOUNTER FOR LIPID SCREENING FOR CARDIOVASCULAR DISEASE: Status: ACTIVE | Noted: 2021-04-29

## 2021-04-29 PROBLEM — Z13.29 SCREENING FOR THYROID DISORDER: Status: ACTIVE | Noted: 2021-04-29

## 2021-04-29 NOTE — PROGRESS NOTES
Mitzy Rojas is a 64 y o   female and is here for routine health maintenance  The patient reports complains of a recent fall with injury to her left knee and left foot  History of Present Illness     Pt seen for physical and had a fall with injury to her left knee and foot  Otherwise ok  No recent illnesses  No chest pain or shortness of breath  bms normal        Well Adult Physical   Patient here for a comprehensive physical exam       Diet and Physical Activity  Diet: well balanced diet  Weight concerns: Patient is overweight (BMI 25 0-29  9)  Exercise: frequently      Depression Screen  PHQ-9 Depression Screening    PHQ-9:   Frequency of the following problems over the past two weeks:      Little interest or pleasure in doing things: 0 - not at all  Feeling down, depressed, or hopeless: 0 - not at all  PHQ-2 Score: 0          General Health  Hearing: Normal:  bilateral  Vision: no vision problems  Dental: regular dental visits     History:  LMP: No LMP recorded  Patient has had a hysterectomy  Cancer Screening  Colononoscopy due for colonoscopy  Mammogram due for mammogram  Pap not indicated    Smoker NO Annual screening with low-dose helical computed tomography (CT) for patients age 54 to 76 years with history of smoking at least 30 pack-years and, if a former smoker, had quit within the previous 15 years      The following portions of the patient's history were reviewed and updated as appropriate: allergies, current medications, past family history, past medical history, past social history, past surgical history and problem list     Review of Systems     Review of Systems   Constitutional: Negative  Negative for fatigue and fever  HENT: Negative  Eyes: Negative  Respiratory: Negative  Negative for cough  Cardiovascular: Negative  Gastrointestinal: Negative  Endocrine: Negative  Genitourinary: Negative  Musculoskeletal: Positive for arthralgias     Skin: Negative  Allergic/Immunologic: Negative  Neurological: Negative  Psychiatric/Behavioral: Negative          Past Medical History     Past Medical History:   Diagnosis Date    Breast lump     Instability of knee joint     Lateral epicondylitis     Neoplasm of uncertain behavior of skin        Past Surgical History     Past Surgical History:   Procedure Laterality Date    BREAST EXCISIONAL BIOPSY Left 1993    benign    HYSTERECTOMY      age 48   Geeta Rafifaheem KNEE SURGERY      OOPHORECTOMY Bilateral     with hysterectomy, age 48       Social History     Social History     Socioeconomic History    Marital status: /Civil Union     Spouse name: None    Number of children: None    Years of education: None    Highest education level: None   Occupational History    None   Social Needs    Financial resource strain: None    Food insecurity     Worry: None     Inability: None    Transportation needs     Medical: None     Non-medical: None   Tobacco Use    Smoking status: Former Smoker    Smokeless tobacco: Never Used   Substance and Sexual Activity    Alcohol use: Yes     Comment: Social     Drug use: None    Sexual activity: None   Lifestyle    Physical activity     Days per week: None     Minutes per session: None    Stress: None   Relationships    Social connections     Talks on phone: None     Gets together: None     Attends Bahai service: None     Active member of club or organization: None     Attends meetings of clubs or organizations: None     Relationship status: None    Intimate partner violence     Fear of current or ex partner: None     Emotionally abused: None     Physically abused: None     Forced sexual activity: None   Other Topics Concern    None   Social History Narrative    None       Family History     Family History   Problem Relation Age of Onset    Endometrial cancer Paternal Grandmother         age unknown    Cancer Mother     Diabetes Father     Cancer Maternal Grandmother     Ovarian cancer Maternal Grandmother         young, guessing 42's    Cancer Maternal Grandfather     No Known Problems Sister     No Known Problems Daughter     No Known Problems Paternal Grandfather     No Known Problems Sister     No Known Problems Sister     No Known Problems Paternal Aunt     No Known Problems Paternal Aunt        Current Medications       Current Outpatient Medications:     nystatin (MYCOSTATIN) cream, Apply topically 2 (two) times a day, Disp: 30 g, Rfl: 0     Allergies     No Known Allergies    Objective     /78   Pulse (!) 107   Temp 97 5 °F (36 4 °C)   Ht 5' 9" (1 753 m)   Wt 78 kg (172 lb)   SpO2 99%   BMI 25 40 kg/m²      Physical Exam  Vitals signs and nursing note reviewed  Constitutional:       Appearance: She is well-developed  HENT:      Head: Normocephalic  Right Ear: External ear normal       Left Ear: External ear normal       Nose: Nose normal    Eyes:      Conjunctiva/sclera: Conjunctivae normal       Pupils: Pupils are equal, round, and reactive to light  Neck:      Musculoskeletal: Normal range of motion and neck supple  Cardiovascular:      Rate and Rhythm: Normal rate and regular rhythm  Heart sounds: Normal heart sounds  Pulmonary:      Effort: Pulmonary effort is normal       Breath sounds: Normal breath sounds  Abdominal:      General: Bowel sounds are normal       Palpations: Abdomen is soft  Musculoskeletal:         General: Swelling, tenderness, deformity and signs of injury present  Comments: Left knee effusion, mild  Ecchymosis and tenderness left foot distal over 4th and 5th metatarsal, mild edema  Skin:     General: Skin is warm and dry  Neurological:      Mental Status: She is alert and oriented to person, place, and time  Psychiatric:         Behavior: Behavior normal          Thought Content: Thought content normal          Judgment: Judgment normal        BMI Counseling:  Body mass index is 25 4 kg/m²  The BMI is above normal  Nutrition recommendations include reducing portion sizes  Exercise recommendations include exercising 3-5 times per week  No exam data present    Health Maintenance     Health Maintenance   Topic Date Due    Hepatitis C Screening  Never done    HIV Screening  Never done    BMI: Followup Plan  03/16/2021    Annual Physical  03/16/2021    MAMMOGRAM  05/21/2021    Colonoscopy Surveillance  05/26/2021 (Originally 4/22/2020)    Influenza Vaccine (1) 06/30/2021 (Originally 9/1/2020)    COVID-19 Vaccine (1) 07/26/2021 (Originally 6/7/1978)    DTaP,Tdap,and Td Vaccines (2 - Td) 08/11/2021    Depression Screening PHQ  04/26/2022    BMI: Adult  04/26/2022    Colorectal Cancer Screening  04/22/2023    Pneumococcal Vaccine: Pediatrics (0 to 5 Years) and At-Risk Patients (6 to 59 Years)  Aged Out    HIB Vaccine  Aged Out    Hepatitis B Vaccine  Aged Out    IPV Vaccine  Aged Out    Hepatitis A Vaccine  Aged Out    Meningococcal ACWY Vaccine  Aged Out    HPV Vaccine  Aged [employer] History   Administered Date(s) Administered    Tdap 08/11/2011       Assessment/Plan         1  Healthy female exam   2  Patient Counseling:   · Nutrition: Stressed importance of a well balanced diet, moderation of sodium/saturated fat, caloric balance and sufficient intake of fiber  · Exercise: Stressed the importance of regular exercise with a goal of 150 minutes per week  · Dental Health: Discussed daily flossing and brushing and regular dental visits     · Immunizations reviewed  · Discussed benefits of screening   · Discussed the patient's BMI with her  The BMI is above average; BMI management plan is completed  3  Cancer Screening   4  Labs   5  Xray left foot,  6  Follow up in one year      Teresa Monday, DO

## 2021-04-29 NOTE — ASSESSMENT & PLAN NOTE
Xray left foot, r/o fracture distal 4th and 5th metatarsal   If no fracture, rest, cool compresses, elevate, compression knee hi, call if ongoing symptoms

## 2021-05-04 NOTE — TELEPHONE ENCOUNTER
As a follow-up, a second attempt has been made for outreach via fax, please see Contacts section for details      Thank you  Madison Loja

## 2021-05-06 NOTE — TELEPHONE ENCOUNTER
Upon review of the In Basket request we reviewed the chart and found this was previously resulted 4/9/21  The date of service 6/5/20 is reflected in HM  Any additional questions or concerns should be emailed to the Practice Liaisons via [email or web address] email, please do not reply via In Basket      Thank you  Rheba Notice

## 2021-07-26 ENCOUNTER — HOSPITAL ENCOUNTER (OUTPATIENT)
Dept: MAMMOGRAPHY | Facility: IMAGING CENTER | Age: 61
Discharge: HOME | End: 2021-07-26
Payer: COMMERCIAL

## 2021-07-26 VITALS — WEIGHT: 175 LBS | BODY MASS INDEX: 25.92 KG/M2 | HEIGHT: 69 IN

## 2021-07-26 DIAGNOSIS — Z12.31 SCREENING MAMMOGRAM FOR HIGH-RISK PATIENT: ICD-10-CM

## 2021-07-26 PROCEDURE — 77063 BREAST TOMOSYNTHESIS BI: CPT

## 2021-07-26 PROCEDURE — 77067 SCR MAMMO BI INCL CAD: CPT

## 2021-08-10 DIAGNOSIS — B34.9 VIRAL INFECTION, UNSPECIFIED: Primary | ICD-10-CM

## 2021-08-10 PROCEDURE — U0005 INFEC AGEN DETEC AMPLI PROBE: HCPCS | Performed by: FAMILY MEDICINE

## 2021-08-10 PROCEDURE — U0003 INFECTIOUS AGENT DETECTION BY NUCLEIC ACID (DNA OR RNA); SEVERE ACUTE RESPIRATORY SYNDROME CORONAVIRUS 2 (SARS-COV-2) (CORONAVIRUS DISEASE [COVID-19]), AMPLIFIED PROBE TECHNIQUE, MAKING USE OF HIGH THROUGHPUT TECHNOLOGIES AS DESCRIBED BY CMS-2020-01-R: HCPCS | Performed by: FAMILY MEDICINE

## 2021-08-13 ENCOUNTER — TELEPHONE (OUTPATIENT)
Dept: FAMILY MEDICINE CLINIC | Facility: CLINIC | Age: 61
End: 2021-08-13

## 2021-08-13 DIAGNOSIS — R11.0 NAUSEA: Primary | ICD-10-CM

## 2021-08-13 RX ORDER — ONDANSETRON 4 MG/1
4 TABLET, ORALLY DISINTEGRATING ORAL EVERY 8 HOURS PRN
Qty: 20 TABLET | Refills: 0 | Status: SHIPPED | OUTPATIENT
Start: 2021-08-13

## 2021-08-13 NOTE — TELEPHONE ENCOUNTER
Pt call and says she is not getting better from tested positive for covid 10 days she been feeling sick wants to know what can she do  Vomiting,tired and headache

## 2021-08-13 NOTE — TELEPHONE ENCOUNTER
I sent a message through [employer] that I sent ondansetron to the pharmacy for nausea  Can take ibuprofen or aleve as needed for headache  Fluids    If her breathing is worse, to ED

## 2021-09-07 ENCOUNTER — TELEPHONE (OUTPATIENT)
Dept: FAMILY MEDICINE CLINIC | Facility: CLINIC | Age: 61
End: 2021-09-07

## 2021-09-07 DIAGNOSIS — M25.462 EFFUSION OF LEFT KNEE: ICD-10-CM
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
## 2021-09-07 NOTE — TELEPHONE ENCOUNTER
Linda saw you a while ago after a fall  She still has a swollen, painful knee  Would you be able to order xrays for this  She scheduled to see you on Monday but was hoping to get this done prior to the visit      L knee

## 2021-09-08 NOTE — TELEPHONE ENCOUNTER
David Bella can you please do a referral for Linda  She is having a left knee xray at [place] [address] for knee pain on Wednesday  Thank you!

## 2021-09-11 ENCOUNTER — HOSPITAL ENCOUNTER (OUTPATIENT)
Dept: RADIOLOGY | Facility: HOSPITAL | Age: 61
Discharge: HOME | End: 2021-09-11
Payer: COMMERCIAL

## 2021-09-11 DIAGNOSIS — M25.462 EFFUSION OF LEFT KNEE: ICD-10-CM
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
## 2021-09-11 PROCEDURE — 73564 X-RAY EXAM KNEE 4 OR MORE: CPT

## 2021-09-17 ENCOUNTER — TELEPHONE (OUTPATIENT)
Dept: FAMILY MEDICINE CLINIC | Facility: CLINIC | Age: 61
End: 2021-09-17

## 2021-09-17 NOTE — TELEPHONE ENCOUNTER
Xray shows arthritis in the knee and a small collection of fluid, no fractures  Sent through [employer]

## 2021-09-17 NOTE — TELEPHONE ENCOUNTER
Pt called for the xray results  They are not read yet  I called over and they will read it    She had them done on the 11th

## 2021-10-04 ENCOUNTER — TELEPHONE (OUTPATIENT)
Dept: FAMILY MEDICINE CLINIC | Facility: CLINIC | Age: 61
End: 2021-10-04

## 2022-10-12 PROBLEM — Z11.59 NEED FOR HEPATITIS C SCREENING TEST: Status: RESOLVED | Noted: 2021-04-29 | Resolved: 2022-10-12

## 2022-10-12 PROBLEM — Z13.0 SCREENING FOR IRON DEFICIENCY ANEMIA: Status: RESOLVED | Noted: 2021-04-29 | Resolved: 2022-10-12

## 2022-10-12 PROBLEM — Z13.29 SCREENING FOR THYROID DISORDER: Status: RESOLVED | Noted: 2021-04-29 | Resolved: 2022-10-12

## 2022-10-12 PROBLEM — Z13.6 ENCOUNTER FOR LIPID SCREENING FOR CARDIOVASCULAR DISEASE: Status: RESOLVED | Noted: 2021-04-29 | Resolved: 2022-10-12

## 2022-10-12 PROBLEM — Z13.220 ENCOUNTER FOR LIPID SCREENING FOR CARDIOVASCULAR DISEASE: Status: RESOLVED | Noted: 2021-04-29 | Resolved: 2022-10-12

## 2022-10-31 ENCOUNTER — HOSPITAL ENCOUNTER (OUTPATIENT)
Dept: MAMMOGRAPHY | Facility: IMAGING CENTER | Age: 62
Discharge: HOME | End: 2022-10-31

## 2022-10-31 VITALS — BODY MASS INDEX: 25.18 KG/M2 | WEIGHT: 170 LBS | HEIGHT: 69 IN

## 2022-10-31 DIAGNOSIS — Z12.31 ENCOUNTER FOR SCREENING MAMMOGRAM FOR MALIGNANT NEOPLASM OF BREAST: ICD-10-CM

## 2022-12-19 ENCOUNTER — OFFICE VISIT (OUTPATIENT)
Dept: FAMILY MEDICINE CLINIC | Facility: CLINIC | Age: 62
End: 2022-12-19

## 2022-12-19 VITALS
DIASTOLIC BLOOD PRESSURE: 80 MMHG | TEMPERATURE: 97.8 F | HEIGHT: 69 IN | BODY MASS INDEX: 26.04 KG/M2 | OXYGEN SATURATION: 99 % | SYSTOLIC BLOOD PRESSURE: 122 MMHG | WEIGHT: 175.8 LBS | HEART RATE: 80 BPM

## 2022-12-19 DIAGNOSIS — Z00.00 WELL ADULT EXAM: Primary | ICD-10-CM

## 2022-12-19 DIAGNOSIS — Z13.1 SCREENING FOR DIABETES MELLITUS (DM): ICD-10-CM

## 2022-12-19 DIAGNOSIS — Z13.0 SCREENING FOR DEFICIENCY ANEMIA: ICD-10-CM

## 2022-12-19 DIAGNOSIS — Z13.220 SCREENING FOR LIPID DISORDERS: ICD-10-CM

## 2022-12-19 DIAGNOSIS — Z13.29 SCREENING FOR ENDOCRINE DISORDER: ICD-10-CM

## 2022-12-19 DIAGNOSIS — M17.12 PRIMARY OSTEOARTHRITIS OF LEFT KNEE: ICD-10-CM

## 2022-12-19 DIAGNOSIS — Z13.29 SCREENING FOR ENDOCRINE, METABOLIC AND IMMUNITY DISORDER: ICD-10-CM

## 2022-12-19 DIAGNOSIS — Z13.228 SCREENING FOR ENDOCRINE, METABOLIC AND IMMUNITY DISORDER: ICD-10-CM

## 2022-12-19 DIAGNOSIS — Z13.0 SCREENING FOR ENDOCRINE, METABOLIC AND IMMUNITY DISORDER: ICD-10-CM
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
## 2022-12-19 PROBLEM — M79.672 LEFT FOOT PAIN: Status: RESOLVED | Noted: 2021-04-26 | Resolved: 2022-12-19

## 2022-12-19 PROBLEM — B35.4 TINEA CORPORIS: Status: RESOLVED | Noted: 2020-03-16 | Resolved: 2022-12-19

## 2022-12-19 RX ORDER — MELOXICAM 15 MG/1
15 TABLET ORAL DAILY
Qty: 90 TABLET | Refills: 1 | Status: SHIPPED | OUTPATIENT
Start: 2022-12-19

## 2022-12-19 NOTE — PATIENT INSTRUCTIONS
Fasting blood work recommended   Meloxicam 15mg 1 tab daily as needed with food  Wellness Visit for Adults   AMBULATORY CARE:   A wellness visit  is when you see your healthcare provider to get screened for health problems  Your healthcare provider will also give you advice on how to stay healthy  Write down your questions so you remember to ask them  Ask your healthcare provider how often you should have a wellness visit  What happens at a wellness visit:  Your healthcare provider will ask about your health, and your family history of health problems  This includes high blood pressure, heart disease, and cancer  He or she will ask if you have symptoms that concern you, if you smoke, and about your mood  You may also be asked about your intake of medicines, supplements, food, and alcohol  Any of the following may be done: Your weight  will be checked  Your height may also be checked so your body mass index (BMI) can be calculated  Your BMI shows if you are at a healthy weight  Your blood pressure  and heart rate will be checked  Your temperature may also be checked  Blood and urine tests  may be done  Blood tests may be done to check your cholesterol levels  Abnormal cholesterol levels increase your risk for heart disease and stroke  You may also need a blood or urine test to check for diabetes if you are at increased risk  Urine tests may be done to look for signs of an infection or kidney disease  A physical exam  includes checking your heartbeat and lungs with a stethoscope  Your healthcare provider may also check your skin to look for sun damage  Screening tests  may be recommended  A screening test is done to check for diseases that may not cause symptoms  The screening tests you may need depend on your age, gender, family history, and lifestyle habits  For example, colorectal screening may be recommended if you are 48years old or older      Screening tests you need if you are a woman:   A Pap smear  is used to screen for cervical cancer  Pap smears are usually done every 3 to 5 years depending on your age  You may need them more often if you have had abnormal Pap smear test results in the past  Ask your healthcare provider how often you should have a Pap smear  A mammogram  is an x-ray of your breasts to screen for breast cancer  Experts recommend mammograms every 2 years starting at age 48 years  You may need a mammogram at age 52 years or younger if you have an increased risk for breast cancer  Talk to your healthcare provider about when you should start having mammograms and how often you need them  Vaccines you may need:   Get an influenza vaccine  every year  The influenza vaccine protects you from the flu  Several types of viruses cause the flu  The viruses change over time, so new vaccines are made each year  Get a tetanus-diphtheria (Td) booster vaccine  every 10 years  This vaccine protects you against tetanus and diphtheria  Tetanus is a severe infection that may cause painful muscle spasms and lockjaw  Diphtheria is a severe bacterial infection that causes a thick covering in the back of your mouth and throat  Get a human papillomavirus (HPV) vaccine  if you are female and aged 23 to 32 or male 23 to 24 and never received it  This vaccine protects you from HPV infection  HPV is the most common infection spread by sexual contact  HPV may also cause vaginal, penile, and anal cancers  Get a pneumococcal vaccine  if you are aged 72 years or older  The pneumococcal vaccine is an injection given to protect you from pneumococcal disease  Pneumococcal disease is an infection caused by pneumococcal bacteria  The infection may cause pneumonia, meningitis, or an ear infection  Get a shingles vaccine  if you are 60 or older, even if you have had shingles before  The shingles vaccine is an injection to protect you from the varicella-zoster virus   This is the same virus that causes chickenpox  Shingles is a painful rash that develops in people who had chickenpox or have been exposed to the virus  How to eat healthy:  My Plate is a model for planning healthy meals  It shows the types and amounts of foods that should go on your plate  Fruits and vegetables make up about half of your plate, and grains and protein make up the other half  A serving of dairy is included on the side of your plate  The amount of calories and serving sizes you need depends on your age, gender, weight, and height  Examples of healthy foods are listed below:  Eat a variety of vegetables  such as dark green, red, and orange vegetables  You can also include canned vegetables low in sodium (salt) and frozen vegetables without added butter or sauces  Eat a variety of fresh fruits , canned fruit in 100% juice, frozen fruit, and dried fruit  Include whole grains  At least half of the grains you eat should be whole grains  Examples include whole-wheat bread, wheat pasta, brown rice, and whole-grain cereals such as oatmeal     Eat a variety of protein foods such as seafood (fish and shellfish), lean meat, and poultry without skin (turkey and chicken)  Examples of lean meats include pork leg, shoulder, or tenderloin, and beef round, sirloin, tenderloin, and extra lean ground beef  Other protein foods include eggs and egg substitutes, beans, peas, soy products, nuts, and seeds  Choose low-fat dairy products such as skim or 1% milk or low-fat yogurt, cheese, and cottage cheese  Limit unhealthy fats  such as butter, hard margarine, and shortening  Exercise:  Exercise at least 30 minutes per day on most days of the week  Some examples of exercise include walking, biking, dancing, and swimming  You can also fit in more physical activity by taking the stairs instead of the elevator or parking farther away from stores  Include muscle strengthening activities 2 days each week   Regular exercise provides many health benefits  It helps you manage your weight, and decreases your risk for type 2 diabetes, heart disease, stroke, and high blood pressure  Exercise can also help improve your mood  Ask your healthcare provider about the best exercise plan for you  General health and safety guidelines:   Do not smoke  Nicotine and other chemicals in cigarettes and cigars can cause lung damage  Ask your healthcare provider for information if you currently smoke and need help to quit  E-cigarettes or smokeless tobacco still contain nicotine  Talk to your healthcare provider before you use these products  Limit alcohol  A drink of alcohol is 12 ounces of beer, 5 ounces of wine, or 1½ ounces of liquor  Lose weight, if needed  Being overweight increases your risk of certain health conditions  These include heart disease, high blood pressure, type 2 diabetes, and certain types of cancer  Protect your skin  Do not sunbathe or use tanning beds  Use sunscreen with a SPF 15 or higher  Apply sunscreen at least 15 minutes before you go outside  Reapply sunscreen every 2 hours  Wear protective clothing, hats, and sunglasses when you are outside  Drive safely  Always wear your seatbelt  Make sure everyone in your car wears a seatbelt  A seatbelt can save your life if you are in an accident  Do not use your cell phone when you are driving  This could distract you and cause an accident  Pull over if you need to make a call or send a text message  Practice safe sex  Use latex condoms if are sexually active and have more than one partner  Your healthcare provider may recommend screening tests for sexually transmitted infections (STIs)  Wear helmets, lifejackets, and protective gear  Always wear a helmet when you ride a bike or motorcycle, go skiing, or play sports that could cause a head injury  Wear protective equipment when you play sports  Wear a lifejacket when you are on a boat or doing water sports      © Copyright [employer] 2022 Information is for [employer] use only and may not be sold, redistributed or otherwise used for commercial purposes  All illustrations and images included in CareNotes® are the copyrighted property of A D A M , Inc  or [employer]  The above information is an  only  It is not intended as medical advice for individual conditions or treatments  Talk to your doctor, nurse or pharmacist before following any medical regimen to see if it is safe and effective for you

## 2022-12-19 NOTE — PROGRESS NOTES
Irving Gabriel is a 63 y o   female and is here for routine health maintenance  The patient reports no problems  History of covid 19, no residual        History of Present Illness     Pt here for a physical today  Complains of left knee swelling  Knee operations in the past  No recent injury  Broke up a wrestling match with her dogs recently but no specific injury  Took one of husbands meloxicam and swelling improved  Has had injections in the past in the left knee  Well Adult Physical   Patient here for a comprehensive physical exam       Diet and Physical Activity  Diet: well balanced diet  Weight concerns: Patient is overweight (BMI 25 0-29  9)  Exercise: frequently      Depression Screen  PHQ-2/9 Depression Screening    Little interest or pleasure in doing things: 0 - not at all  Feeling down, depressed, or hopeless: 0 - not at all  PHQ-2 Score: 0  PHQ-2 Interpretation: Negative depression screen          General Health  Hearing: Normal:  bilateral  Vision: no vision problems  Dental: regular dental visits     History:  LMP: No LMP recorded  Patient has had a hysterectomy  Cancer Screening  Colononoscopy up to date, next 2025  Mammogram up to date next 10/2023  Pap not indicated, hyster  Abnormal pap? no  Smoker NO Annual screening with low-dose helical computed tomography (CT) for patients age 54 to 76 years with history of smoking at least 30 pack-years and, if a former smoker, had quit within the previous 15 years      The following portions of the patient's history were reviewed and updated as appropriate: allergies, current medications, past family history, past medical history, past social history, past surgical history and problem list     Review of Systems     Review of Systems   Constitutional: Negative  Negative for fatigue and fever  HENT: Negative  Eyes: Negative  Respiratory: Negative  Negative for cough  Cardiovascular: Negative      Gastrointestinal: Negative  Endocrine: Negative  Genitourinary: Negative  Musculoskeletal: Positive for neck pain  Skin: Negative  Allergic/Immunologic: Negative  Neurological: Negative  Psychiatric/Behavioral: Negative          Past Medical History     Past Medical History:   Diagnosis Date   • Breast lump    • Instability of knee joint    • Lateral epicondylitis    • Neoplasm of uncertain behavior of skin        Past Surgical History     Past Surgical History:   Procedure Laterality Date   • BREAST EXCISIONAL BIOPSY Left 1993    benign   • HYSTERECTOMY      age 48   • KNEE SURGERY     • OOPHORECTOMY Bilateral     with hysterectomy, age 48       Social History     Social History     Socioeconomic History   • Marital status: /Civil Union     Spouse name: None   • Number of children: None   • Years of education: None   • Highest education level: None   Occupational History   • None   Tobacco Use   • Smoking status: Former   • Smokeless tobacco: Never   Vaping Use   • Vaping Use: Never used   Substance and Sexual Activity   • Alcohol use: Yes     Comment: Social    • Drug use: None   • Sexual activity: None   Other Topics Concern   • None   Social History Narrative   • None     Social Determinants of Health     Financial Resource Strain: Not on file   Food Insecurity: Not on file   Transportation Needs: Not on file   Physical Activity: Not on file   Stress: Not on file   Social Connections: Not on file   Intimate Partner Violence: Not on file   Housing Stability: Not on file       Family History     Family History   Problem Relation Age of Onset   • Cancer Mother         t-cell lymphoma   • Diabetes Father    • No Known Problems Sister    • No Known Problems Sister    • No Known Problems Sister    • No Known Problems Daughter    • Cancer Maternal Grandmother    • Ovarian cancer Maternal Grandmother         young, guessing 42's   • Cancer Maternal Grandfather    • Endometrial cancer Paternal Grandmother age unknown   • No Known Problems Paternal Grandfather    • No Known Problems Paternal Aunt    • No Known Problems Paternal Aunt        Current Medications       Current Outpatient Medications:   •  meloxicam (Mobic) 15 mg tablet, Take 1 tablet (15 mg total) by mouth daily, Disp: 90 tablet, Rfl: 1     Allergies     No Known Allergies    Objective     /80   Pulse 80   Temp 97 8 °F (36 6 °C)   Ht 5' 9" (1 753 m)   Wt 79 7 kg (175 lb 12 8 oz)   SpO2 99%   BMI 25 96 kg/m²      Physical Exam  Vitals and nursing note reviewed  Constitutional:       Appearance: She is well-developed  HENT:      Head: Normocephalic and atraumatic  Right Ear: External ear normal       Left Ear: External ear normal       Nose: Nose normal    Eyes:      Conjunctiva/sclera: Conjunctivae normal       Pupils: Pupils are equal, round, and reactive to light  Cardiovascular:      Rate and Rhythm: Normal rate and regular rhythm  Heart sounds: Normal heart sounds  Pulmonary:      Effort: Pulmonary effort is normal       Breath sounds: Normal breath sounds  Abdominal:      General: Bowel sounds are normal       Palpations: Abdomen is soft  Musculoskeletal:         General: Tenderness present  No swelling, deformity or signs of injury  Normal range of motion  Cervical back: Normal range of motion and neck supple  Comments: Tenderness medial joint line, no effusion  Crepitance mild   Skin:     General: Skin is warm and dry  Neurological:      Mental Status: She is alert and oriented to person, place, and time  Psychiatric:         Behavior: Behavior normal          Thought Content: Thought content normal          Judgment: Judgment normal            No results found      Health Maintenance     Health Maintenance   Topic Date Due   • Hepatitis C Screening  Never done   • Hepatitis B Vaccine (1 of 3 - 3-dose series) Never done   • COVID-19 Vaccine (1) Never done   • HIV Screening  Never done   • DTaP,Tdap,and Td Vaccines (2 - Td or Tdap) 08/11/2021   • BMI: Followup Plan  04/29/2022   • Influenza Vaccine (1) 06/30/2023 (Originally 9/1/2022)   • Breast Cancer Screening: Mammogram  10/31/2023   • Depression Screening  12/19/2023   • BMI: Adult  12/19/2023   • Annual Physical  12/19/2023   • Colorectal Cancer Screening  06/05/2025   • Pneumococcal Vaccine: Pediatrics (0 to 5 Years) and At-Risk Patients (6 to 59 Years)  Aged Out   • HIB Vaccine  Aged Out   • IPV Vaccine  Aged Out   • Hepatitis A Vaccine  Aged Out   • Meningococcal ACWY Vaccine  Aged Out   • HPV Vaccine  Aged [employer] History   Administered Date(s) Administered   • Tdap 08/11/2011       Assessment/Plan         1  Healthy female exam   2  Patient Counseling:   · Nutrition: Stressed importance of a well balanced diet, moderation of sodium/saturated fat, caloric balance and sufficient intake of fiber  · Exercise: Stressed the importance of regular exercise with a goal of 150 minutes per week  · Dental Health: Discussed daily flossing and brushing and regular dental visits     · Immunizations reviewed  · Discussed benefits of screening   · Discussed the patient's BMI with her  The BMI is in the acceptable range  3  Cancer Screening   4  Labs - fasting blood work  5  meloxicam for left knee pain as needed  6  Follow up in one year      Milagros Dunlap DO

## 2023-07-07 ENCOUNTER — TELEPHONE (OUTPATIENT)
Dept: FAMILY MEDICINE CLINIC | Facility: CLINIC | Age: 63
End: 2023-07-07

## 2023-07-07 DIAGNOSIS — R68.89 SUSPECTED LYME DISEASE: Primary | ICD-10-CM

## 2023-07-07 RX ORDER — CEFUROXIME AXETIL 500 MG/1
500 TABLET ORAL EVERY 12 HOURS SCHEDULED
Qty: 28 TABLET | Refills: 0 | Status: SHIPPED | OUTPATIENT
Start: 2023-07-07 | End: 2023-07-21

## 2023-07-07 NOTE — TELEPHONE ENCOUNTER
Pt is calling because she had found 2 deer ticks last week. Pt says that this week she feels achey and tired, not how she usually is. Pt says she has headaches starting from yesterday. Pt would like to know if the deer ticks could possibly be the cause of how she is feeling.     Please advise

## 2023-10-26 ENCOUNTER — TELEPHONE (OUTPATIENT)
Dept: FAMILY MEDICINE CLINIC | Facility: CLINIC | Age: 63
End: 2023-10-26

## 2023-10-26 NOTE — TELEPHONE ENCOUNTER
Patient is asking if you are able to prescribe her inhaler for her. She was diagnosed with allergies to horses, which she states she had spoken to you about before. After her allergy testing she was prescribed a Pro Air inhaler which she is now out of. Are you able to prescribe this for her rather than returning to her allergist?    JUAN LUIS Mccarty.     Please advise patient at [phone]

## 2023-10-27 DIAGNOSIS — J45.20 MILD INTERMITTENT ASTHMA WITHOUT COMPLICATION: Primary | ICD-10-CM

## 2023-10-27 RX ORDER — ALBUTEROL SULFATE 90 UG/1
2 AEROSOL, METERED RESPIRATORY (INHALATION) EVERY 4 HOURS PRN
Qty: 18 G | Refills: 2 | Status: SHIPPED | OUTPATIENT
Start: 2023-10-27

## 2023-10-27 NOTE — TELEPHONE ENCOUNTER
Advised LVM   Sent inhaler to pharmacy Inessa Denson See is a 66 y.o. female     Neurology following for seizures    PMH: Down's syndrome, vasodepressor syncope, SDH s/p craniotomy 2019, hydrocephalus s/p  shunt, Alzheimer's dementia, hypothyroidism    Patient presented from nursing home with seizure-like activity. She is nonverbal at baseline. Patient was witnessed to have seizure-like activity lasting 1 minute--but no description of such. She was started on Keppra    She was previously unresponsive and is nonverbal at baseline. EEG shows seizure potential in her left frontal region but no active seizures. She has been on Keppra with no further seizure-like activity. She is more awake today compared to last week. No family at bedside. Spoke with bed side nurse. Unable to complete review of systems as she is nonverbal    Prior to Visit Medications    Medication Sig Taking? Authorizing Provider   nystatin (MYCOSTATIN) [id number] UNIT/GM cream Apply topically 2 times daily. 30g  Jorge Loera, DO   risperiDONE (RISPERDAL) 0.25 MG tablet Take 1 tablet by mouth 2 times daily  Jorge Loera, DO   risperiDONE (RISPERDAL) 0.25 MG tablet Take 1 tablet by mouth 2 times daily  Jorge Loera DO   donepezil (ARICEPT) 5 MG tablet   Historical Provider, MD   levothyroxine (SYNTHROID) 88 MCG tablet Take 1 tablet by mouth Daily  Jorge Loera DO   liothyronine (CYTOMEL) 5 MCG tablet Take 1 tab by mouth every morning on Axxrzn-Odripdaef-Zhvzfw  Kiya Merrill, DO   Cholecalciferol (VITAMIN D-3 SUPER STRENGTH) 50 MCG (2000 UT) TABS Take 1 tablet by mouth daily  Jorge Loera DO   Lactobacillus (ACIDOPHILUS PO) Take by mouth  Historical Provider, MD       Allergies as of 05/04/2022    (No Known Allergies)       Objective:     /69   Pulse 79   Temp 98.8 °F (37.1 °C) (Oral)   Resp 20   Ht 5' 1\" (1.549 m)   Wt 130 lb (59 kg)   SpO2 95%   BMI 24.56 kg/m²      General appearance: opens eyes to name and keeps them open during exam.  Head: Normocephalic, without obvious abnormality, atraumatic  Neck: supple, trachea midline  Lungs: Unlabored  Extremities:  Edema BLE  Pulses: 2+ and symmetric  Skin: no rashes or lesions    Mental Status: Opens eyes to name, nonverbal at baseline.  Does not follow commands    Speech: nonverbal  Language:nonverbal    Cranial Nerves:  I: smell    II: visual acuity     II: visual fields    II: pupils RD   III,VII: ptosis None   III,IV,VI: extraocular muscles  EOMI without nystagmus    V: mastication    V: facial light touch sensation     V,VII: corneal reflex  Present   VII: facial muscle function - upper     VII: facial muscle function - lower    VIII: hearing Normal   IX: soft palate elevation     IX,X: gag reflex Present   XI: trapezius strength     XI: sternocleidomastoid strength    XI: neck extension strength     XII: tongue strength       Motor:  Contracture BLE   Hypertonia BUE    Sensory:  Unable to perform     Coordination:   Unable to perform    Gait:  Deferred for safety    DTR:   2/2 throughout    Laboratory/Radiology:     CBC with Differential:    Lab Results   Component Value Date    WBC 5.9 05/09/2022    RBC 4.77 05/09/2022    HGB 14.7 05/09/2022    HCT 45.7 05/09/2022     05/09/2022    MCV 95.8 05/09/2022    MCH 30.8 05/09/2022    MCHC 32.2 05/09/2022    RDW 15.6 05/09/2022    SEGSPCT 30 05/15/2012    LYMPHOPCT 39.9 05/05/2022    MONOPCT 8.7 05/05/2022    EOSPCT 3.8 12/13/2018    BASOPCT 1.2 05/05/2022    MONOSABS 0.36 05/05/2022    LYMPHSABS 1.66 05/05/2022    EOSABS 0.14 05/05/2022    BASOSABS 0.05 05/05/2022     BMP:    Lab Results   Component Value Date     05/07/2022    K 4.0 05/07/2022    K 4.0 05/05/2022     05/07/2022    CO2 25 05/07/2022    BUN 20 05/07/2022    LABALBU 2.9 05/07/2022    LABALBU 2.8 05/13/2012    CREATININE 0.6 05/07/2022    CALCIUM 8.4 05/07/2022    GFRAA >60 05/07/2022    LABGLOM >60 05/07/2022    GLUCOSE 141 05/07/2022    GLUCOSE 96 05/15/2012 HgBA1c:    Lab Results   Component Value Date    LABA1C 5.2 03/30/2022     FLP:    Lab Results   Component Value Date    TRIG 88 03/30/2022    HDL 62 03/30/2022    LDLCALC 93 03/30/2022    LABVLDL 18 03/30/2022     Ammonia was 148 on 5/9, today it is 24    I personally reviewed the patient's lab and imaging studies at this time.     Assessment:     Seizure like activity  - EEG- Increased potential for focal seizures found in her left frontal region  - Should remain on Keppra    Down syndrome with cognitive decline  - On Aricept 5 mg HS    Bilateral subdural hygromas   - s/p  shunt  - Multiple adjustments recently  - CT showed decrease subdural hygromas but increase in size of lateral ventricles as well as remote strokes    Patient Active Problem List   Diagnosis    Down's syndrome    Systolic murmur    Vasodepressor syncope    Asymptomatic varicose veins of bilateral lower extremities    Hypothyroidism    Chest pain    SDH (subdural hematoma) (HCC)    Subdural hematoma (HCC)    Communicating hydrocele    Communicating hydrocephalus (HCC)    Hydrocephalus (HCC)    Toe cyanosis    Seizure ([address]    S/P  shunt       Plan:     - Continue Keppra  - No other changes recommended, neurology to follow peripherally- call if needed  - Seizure precautions    BOGDAN Howell - CNP  2:52 PM  5/10/2022

## 2023-12-14 ENCOUNTER — OFFICE VISIT (OUTPATIENT)
Dept: FAMILY MEDICINE CLINIC | Facility: CLINIC | Age: 63
End: 2023-12-14
Payer: COMMERCIAL

## 2023-12-14 VITALS
BODY MASS INDEX: 26.22 KG/M2 | WEIGHT: 177 LBS | HEART RATE: 96 BPM | DIASTOLIC BLOOD PRESSURE: 90 MMHG | OXYGEN SATURATION: 98 % | SYSTOLIC BLOOD PRESSURE: 144 MMHG | TEMPERATURE: 97.4 F | HEIGHT: 69 IN

## 2023-12-14 DIAGNOSIS — M17.12 PRIMARY OSTEOARTHRITIS OF LEFT KNEE: Primary | ICD-10-CM

## 2023-12-14 PROCEDURE — 99214 OFFICE O/P EST MOD 30 MIN: CPT | Performed by: NURSE PRACTITIONER

## 2023-12-14 NOTE — PROGRESS NOTES
"Name: Linda Zimmerman      : 1960      MRN: [id number]  Encounter Provider: GAVI Killian  Encounter Date: 2023   Encounter department: [facility]    Assessment & Plan     1. Primary osteoarthritis of left knee  Assessment & Plan:  Resume meloxicam once daily  Recommend consultation with ortho    Orders:  -     Ambulatory Referral to Orthopedic Surgery; Future      Depression Screening and Follow-up Plan: Patient was screened for depression during today's encounter. They screened negative with a PHQ-2 score of 0.        Subjective      Left knee has been a chronic issue but Monday started with signidicant pain and stiffness- has tried ice, heat compression, and trying to stay off it. Tried meloxicam one time. Does have it at home. Has noticed some swelling above the knee. Has had injections in to the knee in the past, had surgery 10-11 years ago on left knee. Had gel injection last year- keeps doing bandaids. Dr Puentes in [place].     Pain is worse going down stairs.            Review of Systems   Constitutional: Negative.  Negative for fatigue and fever.   HENT: Negative.     Eyes: Negative.    Respiratory: Negative.  Negative for cough.    Cardiovascular: Negative.    Gastrointestinal: Negative.    Endocrine: Negative.    Genitourinary: Negative.    Musculoskeletal:  Positive for arthralgias (left knee pain). Negative for neck pain.   Skin: Negative.    Allergic/Immunologic: Negative.    Neurological: Negative.    Psychiatric/Behavioral: Negative.         Current Outpatient Medications on File Prior to Visit   Medication Sig   • albuterol (ProAir HFA) 90 mcg/act inhaler Inhale 2 puffs every 4 (four) hours as needed for wheezing   • meloxicam (Mobic) 15 mg tablet Take 1 tablet (15 mg total) by mouth daily       Objective     /90   Pulse 96   Temp (!) 97.4 °F (36.3 °C) (Tympanic)   Ht 5' 9\" (1.753 m)   Wt 80.3 kg (177 lb)   SpO2 98%   BMI 26.14 kg/m² "     Physical Exam  Vitals and nursing note reviewed.   Constitutional:       Appearance: Normal appearance. She is well-developed.   HENT:      Head: Normocephalic and atraumatic.   Eyes:      Conjunctiva/sclera: Conjunctivae normal.      Pupils: Pupils are equal, round, and reactive to light.   Cardiovascular:      Rate and Rhythm: Regular rhythm.      Heart sounds: Normal heart sounds. No murmur heard.  Pulmonary:      Effort: Pulmonary effort is normal.      Breath sounds: Normal breath sounds.   Musculoskeletal:      Right knee: Normal.      Left knee: Effusion present. Normal range of motion. Tenderness present.   Neurological:      Mental Status: She is alert and oriented to person, place, and time.   Psychiatric:         Behavior: Behavior normal.         Thought Content: Thought content normal.         Judgment: Judgment normal.       GAVI Killian

## 2023-12-18 ENCOUNTER — TELEPHONE (OUTPATIENT)
Dept: FAMILY MEDICINE CLINIC | Facility: CLINIC | Age: 63
End: 2023-12-18

## 2024-01-22 ENCOUNTER — APPOINTMENT (OUTPATIENT)
Dept: LAB | Facility: HOSPITAL | Age: 64
End: 2024-01-22
Payer: COMMERCIAL

## 2024-01-22 DIAGNOSIS — Z01.818 OTHER SPECIFIED PRE-OPERATIVE EXAMINATION: ICD-10-CM

## 2024-01-22 LAB
ANION GAP SERPL CALCULATED.3IONS-SCNC: 9 MMOL/L
BASOPHILS # BLD AUTO: 0.1 THOUSANDS/ÂΜL (ref 0–0.1)
BASOPHILS NFR BLD AUTO: 1 % (ref 0–1)
BUN SERPL-MCNC: 16 MG/DL (ref 5–25)
CALCIUM SERPL-MCNC: 9.2 MG/DL (ref 8.4–10.2)
CHLORIDE SERPL-SCNC: 103 MMOL/L (ref 96–108)
CO2 SERPL-SCNC: 26 MMOL/L (ref 21–32)
CREAT SERPL-MCNC: 0.86 MG/DL (ref 0.6–1.3)
EOSINOPHIL # BLD AUTO: 0.33 THOUSAND/ÂΜL (ref 0–0.61)
EOSINOPHIL NFR BLD AUTO: 4 % (ref 0–6)
ERYTHROCYTE [DISTWIDTH] IN BLOOD BY AUTOMATED COUNT: 13.5 % (ref 11.6–15.1)
GFR SERPL CREATININE-BSD FRML MDRD: 73 ML/MIN/1.73SQ M
GLUCOSE SERPL-MCNC: 83 MG/DL (ref 65–140)
HCT VFR BLD AUTO: 37.4 % (ref 34.8–46.1)
HGB BLD-MCNC: 11.8 G/DL (ref 11.5–15.4)
IMM GRANULOCYTES # BLD AUTO: 0.02 THOUSAND/UL (ref 0–0.2)
IMM GRANULOCYTES NFR BLD AUTO: 0 % (ref 0–2)
LYMPHOCYTES # BLD AUTO: 2.06 THOUSANDS/ÂΜL (ref 0.6–4.47)
LYMPHOCYTES NFR BLD AUTO: 25 % (ref 14–44)
MCH RBC QN AUTO: 28.9 PG (ref 26.8–34.3)
MCHC RBC AUTO-ENTMCNC: 31.6 G/DL (ref 31.4–37.4)
MCV RBC AUTO: 92 FL (ref 82–98)
MONOCYTES # BLD AUTO: 0.48 THOUSAND/ÂΜL (ref 0.17–1.22)
MONOCYTES NFR BLD AUTO: 6 % (ref 4–12)
NEUTROPHILS # BLD AUTO: 5.18 THOUSANDS/ÂΜL (ref 1.85–7.62)
NEUTS SEG NFR BLD AUTO: 64 % (ref 43–75)
NRBC BLD AUTO-RTO: 0 /100 WBCS
PLATELET # BLD AUTO: 304 THOUSANDS/UL (ref 149–390)
PMV BLD AUTO: 12 FL (ref 8.9–12.7)
POTASSIUM SERPL-SCNC: 3.5 MMOL/L (ref 3.5–5.3)
RBC # BLD AUTO: 4.08 MILLION/UL (ref 3.81–5.12)
SODIUM SERPL-SCNC: 138 MMOL/L (ref 135–147)
WBC # BLD AUTO: 8.17 THOUSAND/UL (ref 4.31–10.16)

## 2024-01-22 PROCEDURE — 36415 COLL VENOUS BLD VENIPUNCTURE: CPT

## 2024-01-22 PROCEDURE — 85025 COMPLETE CBC W/AUTO DIFF WBC: CPT

## 2024-01-22 PROCEDURE — 80048 BASIC METABOLIC PNL TOTAL CA: CPT

## 2024-01-23 LAB
ATRIAL RATE: 96 BPM
P AXIS: 39 DEGREES
PR INTERVAL: 122 MS
QRS AXIS: 2 DEGREES
QRSD INTERVAL: 84 MS
QT INTERVAL: 350 MS
QTC INTERVAL: 442 MS
T WAVE AXIS: 12 DEGREES
VENTRICULAR RATE: 96 BPM

## 2024-02-02 ENCOUNTER — CONSULT (OUTPATIENT)
Dept: FAMILY MEDICINE CLINIC | Facility: CLINIC | Age: 64
End: 2024-02-02
Payer: COMMERCIAL

## 2024-02-02 VITALS
SYSTOLIC BLOOD PRESSURE: 120 MMHG | WEIGHT: 177 LBS | HEIGHT: 69 IN | TEMPERATURE: 97.5 F | OXYGEN SATURATION: 99 % | DIASTOLIC BLOOD PRESSURE: 80 MMHG | HEART RATE: 89 BPM | BODY MASS INDEX: 26.22 KG/M2

## 2024-02-02 DIAGNOSIS — Z12.31 SCREENING MAMMOGRAM FOR BREAST CANCER: ICD-10-CM

## 2024-02-02 DIAGNOSIS — M17.12 PRIMARY OSTEOARTHRITIS OF LEFT KNEE: ICD-10-CM

## 2024-02-02 DIAGNOSIS — Z11.59 NEED FOR HEPATITIS C SCREENING TEST: ICD-10-CM

## 2024-02-02 DIAGNOSIS — Z23 ENCOUNTER FOR IMMUNIZATION: ICD-10-CM

## 2024-02-02 DIAGNOSIS — Z01.818 PRE-OP EXAMINATION: Primary | ICD-10-CM

## 2024-02-02 PROBLEM — L81.9 ABNORMAL PIGMENTATION OF SKIN: Status: ACTIVE | Noted: 2022-03-21

## 2024-02-02 PROBLEM — Z13.29 SCREENING FOR ENDOCRINE DISORDER: Status: RESOLVED | Noted: 2021-04-29 | Resolved: 2024-02-02

## 2024-02-02 PROCEDURE — 90715 TDAP VACCINE 7 YRS/> IM: CPT

## 2024-02-02 PROCEDURE — 99214 OFFICE O/P EST MOD 30 MIN: CPT | Performed by: FAMILY MEDICINE

## 2024-02-02 PROCEDURE — 90471 IMMUNIZATION ADMIN: CPT

## 2024-02-02 NOTE — PROGRESS NOTES
[facility] PRE-OPERATIVE EVALUATION  [facility] PHYSICIAN [facility]           NAME: Linda Zimmerman  AGE: 63 y.o. SEX: female  : 1960     DATE: 2/3/2024    [facility] Pre-Operative Evaluation      Chief Complaint: Pre-operative Evaluation     Surgery: Left total knee replacement  Anticipated Date of Surgery: 2024  Referring Provider: No ref. provider found       History of Present Illness:     Linda Zimmerman is a 63 y.o. female who presents to the office today for a preoperative consultation at the request of surgeon, Dr. Nunn, who plans on performing left total knee replacement on 2024. Planned anesthesia is general. Patient has a bleeding risk of: no recent abnormal bleeding, no remote history of abnormal bleeding, and no use of Ca-channel blockers. Patient does not have objections to receiving blood products if needed. Current anti-platelet/anti-coagulation medications that the patient is prescribed includes:  none .      Assessment of Chronic Conditions:   - albuterol inhaler only for exposure to horses     Assessment of Cardiac Risk:  Denies unstable or severe angina or MI in the last 6 weeks or history of stent placement in the last year   Denies decompensated heart failure (e.g. New onset heart failure, NYHA functional class IV heart failure, or worsening existing heart failure)  Denies significant arrhythmias such as high grade AV block, symptomatic ventricular arrhythmia, newly recognized ventricular tachycardia, supraventricular tachycardia with resting heart rate >100, or symptomatic bradycardia  Denies severe heart valve disease including aortic stenosis or symptomatic mitral stenosis     Exercise Capacity:  Able to walk 4 blocks without symptoms?: Yes, knee swelling  Able to walk 2 flights without symptoms?: Yes    Prior Anesthesia Reactions: No     Personal history of venous thromboembolic disease? Yes, 2012 after left knee surgery - immobile for 6  weeks    History of steroid use for >2 weeks within last year? No         Review of Systems:     Review of Systems   Constitutional: Negative.  Negative for fatigue and fever.   HENT: Negative.     Eyes: Negative.    Respiratory: Negative.  Negative for cough.    Cardiovascular: Negative.    Gastrointestinal: Negative.    Endocrine: Negative.    Genitourinary: Negative.    Musculoskeletal:  Positive for arthralgias.        Left knee pain   Skin: Negative.    Allergic/Immunologic: Negative.    Neurological: Negative.    Psychiatric/Behavioral: Negative.         Current Problem List:     Patient Active Problem List   Diagnosis    Cold thyroid nodule    Allergic rhinitis due to animal dander    Allergies    Esophageal reflux    Lyme disease    Osteoarthritis of knee    Raynaud's phenomenon    Hx of total hysterectomy    Well adult exam    Screening for breast cancer    Effusion of left knee    Abnormal pigmentation of skin    Pre-op examination    Encounter for immunization       Allergies:     No Known Allergies    Current Medications:       Current Outpatient Medications:     albuterol (ProAir HFA) 90 mcg/act inhaler, Inhale 2 puffs every 4 (four) hours as needed for wheezing, Disp: 18 g, Rfl: 2    meloxicam (Mobic) 15 mg tablet, Take 1 tablet (15 mg total) by mouth daily (Patient taking differently: Take 15 mg by mouth as needed), Disp: 90 tablet, Rfl: 1    Past Medical History:       Past Medical History:   Diagnosis Date    Breast lump     Instability of knee joint     Lateral epicondylitis     Neoplasm of uncertain behavior of skin         Past Surgical History:   Procedure Laterality Date    BREAST EXCISIONAL BIOPSY Left 1993    benign    HYSTERECTOMY      age 54    KNEE SURGERY      LYMPH NODE BIOPSY      OOPHORECTOMY Bilateral     with hysterectomy, age 54        Family History   Problem Relation Age of Onset    Cancer Mother         t-cell lymphoma    Diabetes Father     No Known Problems Sister     No Known  "Problems Sister     No Known Problems Sister     No Known Problems Daughter     Cancer Maternal Grandmother     Ovarian cancer Maternal Grandmother         young, guessing 40's    Cancer Maternal Grandfather     Endometrial cancer Paternal Grandmother         age unknown    No Known Problems Paternal Grandfather     No Known Problems Paternal Aunt     No Known Problems Paternal Aunt         Social History     Socioeconomic History    Marital status: /Civil Union     Spouse name: Not on file    Number of children: Not on file    Years of education: Not on file    Highest education level: Not on file   Occupational History    Not on file   Tobacco Use    Smoking status: Former    Smokeless tobacco: Never   Vaping Use    Vaping status: Never Used   Substance and Sexual Activity    Alcohol use: Yes     Alcohol/week: 2.0 standard drinks of alcohol     Types: 2 Glasses of wine per week     Comment: Social     Drug use: Never    Sexual activity: Yes     Partners: Male     Birth control/protection: Female Sterilization   Other Topics Concern    Not on file   Social History Narrative    Not on file     Social Determinants of Health     Financial Resource Strain: Not on file   Food Insecurity: Not on file   Transportation Needs: Not on file   Physical Activity: Not on file   Stress: Not on file   Social Connections: Not on file   Intimate Partner Violence: Not on file   Housing Stability: Not on file        Physical Exam:     /80   Pulse 89   Temp 97.5 °F (36.4 °C)   Ht 5' 9\" (1.753 m)   Wt 80.3 kg (177 lb)   SpO2 99%   BMI 26.14 kg/m²     Physical Exam  Vitals and nursing note reviewed.   Constitutional:       Appearance: She is well-developed.   HENT:      Head: Normocephalic and atraumatic.   Cardiovascular:      Rate and Rhythm: Normal rate and regular rhythm.      Heart sounds: Normal heart sounds.   Pulmonary:      Effort: Pulmonary effort is normal.      Breath sounds: Normal breath sounds. "   Abdominal:      General: Bowel sounds are normal.      Palpations: Abdomen is soft.   Musculoskeletal:         General: Tenderness present.      Comments: Tenderness joint line left   Skin:     General: Skin is warm and dry.   Neurological:      Mental Status: She is alert and oriented to person, place, and time.   Psychiatric:         Behavior: Behavior normal.         Thought Content: Thought content normal.         Judgment: Judgment normal.          Data:     Pre-operative work-up    Laboratory Results: I have personally reviewed the pertinent laboratory results/reports      EKG: I have personally reviewed pertinent reports.      Chest x-ray:  not indicated       Previous cardiopulmonary studies within the past year:  Echocardiogram:   Cardiac Catheterization:   Stress Test:   Pulmonary Function Testing:       Assessment & Recommendations:     1. Pre-op examination        2. Primary osteoarthritis of left knee        3. Encounter for immunization  TDAP VACCINE GREATER THAN OR EQUAL TO 6YO IM      4. Screening mammogram for breast cancer        5. Need for hepatitis C screening test            Pre-Op Evaluation Assessment  63 y.o. female with planned surgery: left total knee replacement.    Known risk factors for perioperative complications: None  .        Current medications which may produce withdrawal symptoms if withheld perioperatively: none.    Pre-Op Evaluation Plan  1. Further preoperative workup as follows:   - None; no further preoperative work-up is required    2. Medication Management/Recommendations:   - Not applicable, not on any medications  - Patient has been instructed to avoid herbs or non-directed vitamins the week prior to surgery to ensure no drug interactions with perioperative surgical and anesthetic medications.  - Patient has been instructed to avoid aspirin containing medications or non-steroidal anti-inflammatory drugs for the week preceding surgery.    3. Prophylaxis for cardiac  events with perioperative beta-blockers: not indicated.    4. Patient requires further consultation with: None    Clearance  Patient is CLEARED for surgery without any additional cardiac testing.     Patricia Stone DO  [facility]  [address] [address]  Phone#  [phone]  Fax#  [phone]

## 2024-02-02 NOTE — PATIENT INSTRUCTIONS
Pt approved for surgery  Pepcid twice a day - can do regularly ok for tums     GERD (Gastroesophageal Reflux Disease)   AMBULATORY CARE:   Gastroesophageal reflux disease (GERD)  is reflux that happens more than 2 times a week for a few weeks. Reflux means acid and food in your stomach back up into your esophagus. GERD can cause other health problems over time if it is not treated.       Common causes of GERD:  GERD often happens because the lower muscle (sphincter) of the esophagus does not close properly. The sphincter normally opens to let food into the stomach. It then closes to keep food and stomach acid in the stomach. If the sphincter does not close properly, stomach acid and food back up (reflux) into the esophagus. The following may increase your risk for GERD:  Certain foods such as spicy foods, chocolate, foods that contain caffeine, peppermint, and fried foods    Hiatal hernia    Certain medicines such as calcium channel blockers (used to treat high blood pressure), allergy medicines, sedatives, or antidepressants    Pregnancy, obesity, or scleroderma    Lying down after a meal    Drinking alcohol or smoking cigarettes    Signs and symptoms:   Heartburn (burning pain in your chest)    Pain after meals that spreads to your neck, jaw, or shoulder    Pain that gets better when you change positions    Bitter or acid taste in your mouth    A dry cough    Trouble swallowing or pain with swallowing    Hoarseness or a sore throat    Burping or hiccups    Feeling full soon after you start eating    Call your local emergency number (911 in the [place]) if:   You have severe chest pain and sudden trouble breathing.      Seek care immediately if:   You have trouble breathing after you vomit.    You have trouble swallowing, or pain with swallowing.    Your bowel movements are black, bloody, or tarry-looking.    Your vomit looks like coffee grounds or has blood in it.    Call your doctor or gastroenterologist if:   You  feel full and cannot burp or vomit.    You vomit large amounts, or you vomit often.    You are losing weight without trying.    Your symptoms get worse or do not improve with treatment.    You have questions or concerns about your condition or care.    Treatment for GERD:   Medicines  are used to decrease stomach acid. Medicine may also be used to help your lower esophageal sphincter and stomach contract (tighten) more.    Surgery  is done to wrap the upper part of the stomach around the esophageal sphincter. This will strengthen the sphincter and prevent reflux.    Manage GERD:       Do not have foods or drinks that may increase heartburn.  These include chocolate, peppermint, fried or fatty foods, drinks that contain caffeine, or carbonated drinks (soda). Other foods include spicy foods, onions, tomatoes, and tomato-based foods. Do not have foods or drinks that can irritate your esophagus, such as citrus fruits, juices, and alcohol.    Do not eat large meals.  When you eat a lot of food at one time, your stomach needs more acid to digest it. Eat 6 small meals each day instead of 3 large meals, and eat slowly. Do not eat meals 2 to 3 hours before bedtime.    Elevate the head of your bed.  Place 6-inch blocks under the head of your bed frame. You may also use more than one pillow under your head and shoulders while you sleep.    Maintain a healthy weight.  If you are overweight, weight loss may help relieve symptoms of GERD.    Do not smoke.  Smoking weakens the lower esophageal sphincter and increases the risk of GERD. Ask your healthcare provider for information if you currently smoke and need help to quit. E-cigarettes or smokeless tobacco still contain nicotine. Talk to your healthcare provider before you use these products.    Do not put pressure on your abdomen.  Pressure pushes acid up into your esophagus. Do not wear clothing that is tight around your waist. Do not bend over. Bend at the knees if you need to  pick something up.  Follow up with your doctor or gastroenterologist as directed:  Write down your questions so you remember to ask them during your visits.  © Copyright [employer] 2023 Information is for End User's use only and may not be sold, redistributed or otherwise used for commercial purposes.  The above information is an  only. It is not intended as medical advice for individual conditions or treatments. Talk to your doctor, nurse or pharmacist before following any medical regimen to see if it is safe and effective for you.

## 2024-02-15 ENCOUNTER — OFFICE VISIT (OUTPATIENT)
Dept: FAMILY MEDICINE CLINIC | Facility: CLINIC | Age: 64
End: 2024-02-15
Payer: COMMERCIAL

## 2024-02-15 VITALS
SYSTOLIC BLOOD PRESSURE: 130 MMHG | WEIGHT: 177 LBS | TEMPERATURE: 98.1 F | BODY MASS INDEX: 26.22 KG/M2 | HEIGHT: 69 IN | HEART RATE: 87 BPM | OXYGEN SATURATION: 98 % | DIASTOLIC BLOOD PRESSURE: 90 MMHG

## 2024-02-15 DIAGNOSIS — B34.9 VIRAL SYNDROME: ICD-10-CM

## 2024-02-15 DIAGNOSIS — R39.9 UTI SYMPTOMS: Primary | ICD-10-CM

## 2024-02-15 LAB
SL AMB  POCT GLUCOSE, UA: ABNORMAL
SL AMB LEUKOCYTE ESTERASE,UA: ABNORMAL
SL AMB POCT BILIRUBIN,UA: ABNORMAL
SL AMB POCT BLOOD,UA: ABNORMAL
SL AMB POCT CLARITY,UA: ABNORMAL
SL AMB POCT COLOR,UA: YELLOW
SL AMB POCT KETONES,UA: ABNORMAL
SL AMB POCT NITRITE,UA: ABNORMAL
SL AMB POCT PH,UA: 6
SL AMB POCT RAPID FLU A: NEGATIVE
SL AMB POCT RAPID FLU B: NEGATIVE
SL AMB POCT SPECIFIC GRAVITY,UA: >=1.03
SL AMB POCT URINE PROTEIN: ABNORMAL
SL AMB POCT UROBILINOGEN: ABNORMAL

## 2024-02-15 PROCEDURE — 81002 URINALYSIS NONAUTO W/O SCOPE: CPT | Performed by: NURSE PRACTITIONER

## 2024-02-15 PROCEDURE — 87804 INFLUENZA ASSAY W/OPTIC: CPT | Performed by: NURSE PRACTITIONER

## 2024-02-15 PROCEDURE — 99214 OFFICE O/P EST MOD 30 MIN: CPT | Performed by: NURSE PRACTITIONER

## 2024-02-15 RX ORDER — CEPHALEXIN 500 MG/1
500 CAPSULE ORAL 2 TIMES DAILY
Qty: 14 CAPSULE | Refills: 0 | Status: SHIPPED | OUTPATIENT
Start: 2024-02-15 | End: 2024-02-22

## 2024-02-15 NOTE — PROGRESS NOTES
Name: Linda Zimmerman      : 1960      MRN: [id number]  Encounter Provider: GAVI Killian  Encounter Date: 2/15/2024   Encounter department: [facility]    Assessment & Plan     1. UTI symptoms  Assessment & Plan:  Urine dip in office negative leuks and blood  Long discussion about surgery on Monday given her symptoms concern is for UTI- culture will result over the weekend  Recommend postponing surgery due to symptoms, fatigue, possible uti  Start keflex twice daily   Increase water intake  Limit caffeine intake    Orders:  -     POCT urine dip  -     Urine culture  -     cephalexin (KEFLEX) 500 mg capsule; Take 1 capsule (500 mg total) by mouth 2 (two) times a day for 7 days    2. Viral syndrome  Assessment & Plan:  Suspect you had influenza over the weekend, to late to test positive right now given symptom onset  Continue symptom management rest    Orders:  -     POCT rapid flu A and B         Subjective      Started over the weekend with fevers, felt really lethargic, cough, felt a little better and then yesterday started with urinary burning.   UTI like symptoms that started yesterday dysuria ,no pressure, having back ache. + chills, frequency.Decreased appetite. + vomiting   She is scheduled for LTKR on 24      Urinary Tract Infection   The current episode started in the past 7 days. The problem occurs every urination. The problem has been unchanged. Associated symptoms include chills, frequency, nausea, urgency and vomiting.     Review of Systems   Constitutional:  Positive for chills and fatigue. Negative for fever.   HENT: Negative.     Respiratory: Negative.     Cardiovascular: Negative.    Gastrointestinal:  Positive for nausea and vomiting. Negative for diarrhea.   Genitourinary:  Positive for dysuria, frequency and urgency.   Musculoskeletal:  Positive for back pain.   Skin:  Negative for rash.   All other systems reviewed and are negative.      Current  "Outpatient Medications on File Prior to Visit   Medication Sig   • albuterol (ProAir HFA) 90 mcg/act inhaler Inhale 2 puffs every 4 (four) hours as needed for wheezing   • meloxicam (Mobic) 15 mg tablet Take 1 tablet (15 mg total) by mouth daily (Patient not taking: Reported on 2/15/2024)       Objective     /90   Pulse 87   Temp 98.1 °F (36.7 °C) (Tympanic)   Ht 5' 9\" (1.753 m)   Wt 80.3 kg (177 lb)   SpO2 98%   BMI 26.14 kg/m²     Physical Exam  Vitals reviewed.   Constitutional:       Appearance: Normal appearance.   HENT:      Head: Normocephalic.   Cardiovascular:      Rate and Rhythm: Normal rate and regular rhythm.      Heart sounds: Normal heart sounds. No murmur heard.  Pulmonary:      Effort: No respiratory distress.      Breath sounds: Normal breath sounds.   Abdominal:      General: Bowel sounds are normal.      Palpations: Abdomen is soft.      Tenderness: There is no right CVA tenderness or left CVA tenderness.   Neurological:      Mental Status: She is alert and oriented to person, place, and time.   Psychiatric:         Mood and Affect: Mood normal.         Behavior: Behavior normal.       GAVI Killian    "

## 2024-02-16 PROBLEM — R39.9 UTI SYMPTOMS: Status: ACTIVE | Noted: 2024-02-16

## 2024-02-16 PROBLEM — B34.9 VIRAL SYNDROME: Status: ACTIVE | Noted: 2024-02-16

## 2024-02-16 NOTE — ASSESSMENT & PLAN NOTE
Suspect you had influenza over the weekend, to late to test positive right now given symptom onset  Continue symptom management rest

## 2024-02-16 NOTE — ASSESSMENT & PLAN NOTE
Urine dip in office negative leuks and blood  Long discussion about surgery on Monday given her symptoms concern is for UTI- culture will result over the weekend  Recommend postponing surgery due to symptoms, fatigue, possible uti  Start keflex twice daily   Increase water intake  Limit caffeine intake

## 2024-02-17 LAB
BACTERIA UR CULT: NORMAL
Lab: NORMAL

## 2024-02-19 ENCOUNTER — TELEPHONE (OUTPATIENT)
Dept: FAMILY MEDICINE CLINIC | Facility: CLINIC | Age: 64
End: 2024-02-19

## 2024-02-19 NOTE — TELEPHONE ENCOUNTER
----- Message from GAVI Killian sent at 2/19/2024  8:17 AM EST -----  Your urine dip shows a small amount of bacteria not usually clinically significant. Have symptoms improved on antibiotic? If so, recommend continuing and completing antibiotic

## 2024-02-19 NOTE — RESULT ENCOUNTER NOTE
Your urine dip shows a small amount of bacteria not usually clinically significant. Have symptoms improved on antibiotic? If so, recommend continuing and completing antibiotic

## 2024-02-20 NOTE — PROGRESS NOTES
Today's date: 2024  Patient name: Linda Zimmerman  : 1960  MRN: [id number]  Referring provider: Lavon Martin PA-C  Dx:   Encounter Diagnosis     ICD-10-CM    1. Primary osteoarthritis of left knee  M17.12                Assessment  Assessment details: Linda Zimmerman is a 63 y.o. female who presents with pain, decreased strength, decreased ROM, decreased joint mobility, joint effusion, ambulatory dysfunction, and balance dysfunction. Due to these impairments, patient has difficulty performing ADL's, recreational activities, work-related activities, engaging in social activities, ambulation, stair negotiation, lifting/carrying, transfers. Patient's clinical presentation is consistent with their referring diagnosis of Primary osteoarthritis of left knee  (primary encounter diagnosis). Patient has been educated in post-op contraindications / precautions, gait training, weight bearing status, home exercise program, and plan of care. Patient would benefit from skilled physical therapy services to address their aforementioned functional limitations and progress towards prior level of function and independence with home exercise program.     Impairments: abnormal gait, abnormal or restricted ROM, activity intolerance, impaired physical strength, lacks appropriate home exercise program, pain with function, weight-bearing intolerance, poor posture  and poor body mechanics  Functional limitations: STS, stairs, squat, gait, in/out bed/car, disturbed sleep, gardening   Prognosis: good  Prognosis details: + factors: high motivation levels, age   - factors: history of L knee arthroscopy/clotting issues    Goals  Short Term Goals to be accomplished by 3/20/24:  STG1: Pt will be I with HEP  STG2: Pt will demo 0-100 degrees in knee AROM to improve gait.   STG3: Pt will demo 4/5 MMT strength in knee to improve stair negotiation.   STG4: Pt will amb community distance without gait deviates due to pain     Long  Term Goals to be accomplished by 5/15/24:   LTG1: Pt will be able to return to riding [employer] bike for cycling classes.  LTG2: Pt will be able to be able to perform squat/kneel for gardening.  LTG3: Pt will be able to walk on unstable surfaces to return to hiking.       Plan  Plan details: HEP development, stretching, strengthening, A/AA/PROM, joint mobilizations, posture education, STM/MI as needed to reduce muscle tension, muscle reeducation, PLOC discussed and agreed upon with patient.      Patient would benefit from: PT eval and skilled physical therapy  Planned modality interventions: cryotherapy, thermotherapy: hydrocollator packs and unattended electrical stimulation  Planned therapy interventions: manual therapy, neuromuscular re-education, self care, therapeutic activities, therapeutic exercise, home exercise program, patient education, joint mobilization, balance, strengthening, stretching and therapeutic training  Frequency: 2x week  Duration in weeks: 12  Plan of Care beginning date: 2024  Plan of Care expiration date: 5/15/2024  Treatment plan discussed with: patient      Subjective Evaluation    History of Present Illness  Mechanism of injury: Pt is a 64 y/o female who presents to PT s/p L TKA performed on 24. She had home care PT for 2 days and then was d/c'd to outpatient PT.   Medication: not currently taking pain meds, Tylenol 2x daily.   Work: 1 day a week   Recreation: [place], hiking, gardening  Medical history: arthroscopy L knee 10 years ago/clotting complications  Patient Goals  Patient goals for therapy: decreased pain, improved balance, increased motion, decreased edema, increased strength and independence with ADLs/IADLs  Patient goal: STS, stairs, squat, gait, in/out bed/car, disturbed sleep, gardening  Pain  Current pain ratin  At best pain ratin  At worst pain rating: 3  Location: L knee  Quality: tight, pulling, discomfort and throbbing  Relieving factors:  relaxation and rest  Aggravating factors: lifting, overhead activity, stair climbing and walking    Treatments  Current treatment: physical therapy      Objective     Observations   Left Knee   Positive for edema.     Additional Observation Details  Incision: wound covered with bandage - told not to remove for 2 weeks, normal skin tone surrounding bandage    Active Range of Motion   Left Knee   Flexion: 75 degrees with pain  Extension: 22 degrees with pain    Passive Range of Motion   Left Knee   Flexion: 90 degrees with pain  Extension: 10 degrees with pain    Strength/Myotome Testing     Left Knee   Flexion: 3-  Extension: 3-    Right Knee   Flexion: 5  Extension: 5    Additional Strength Details  Knee extension lag:   L: 22 Degrees     Tests     Additional Tests Details  SLS: NOT TESTED   R: 3, 2, 3 sec  L: 30 sec       Precautions:   Past Medical History:   Diagnosis Date    Breast lump     Instability of knee joint     Lateral epicondylitis     Neoplasm of uncertain behavior of skin      Manuals 2/21        L knee PROM stretch                                    Neuro Re-Ed 2/21                                                                       Ther Ex 2/21        Seated knee ext stretch :30x         Seated knee flexion stretch :30x         LAQ  10x         SAQ          Quad set :10x2         Weight shift  10x :03                          Ther Activity 2/21        U/l LP          Step up

## 2024-02-21 ENCOUNTER — EVALUATION (OUTPATIENT)
Dept: PHYSICAL THERAPY | Facility: CLINIC | Age: 64
End: 2024-02-21
Payer: COMMERCIAL

## 2024-02-21 DIAGNOSIS — M17.12 PRIMARY OSTEOARTHRITIS OF LEFT KNEE: Primary | ICD-10-CM

## 2024-02-21 PROBLEM — Z00.00 WELL ADULT EXAM: Status: RESOLVED | Noted: 2020-03-16 | Resolved: 2024-02-21

## 2024-02-21 PROBLEM — Z12.39 SCREENING FOR BREAST CANCER: Status: RESOLVED | Noted: 2020-03-16 | Resolved: 2024-02-21

## 2024-02-21 PROCEDURE — 97110 THERAPEUTIC EXERCISES: CPT | Performed by: PHYSICAL THERAPIST

## 2024-02-21 PROCEDURE — 97162 PT EVAL MOD COMPLEX 30 MIN: CPT | Performed by: PHYSICAL THERAPIST

## 2024-02-21 NOTE — LETTER
2024    Lavon Martin PA-C  [address] [facility] [address]    Patient: Linda Zimmerman   YOB: 1960   Date of Visit: 2024     Encounter Diagnosis     ICD-10-CM    1. Primary osteoarthritis of left knee  M17.12           Dear Dr. Martin:    Thank you for your recent referral of Linda Zimmerman. Please review the attached evaluation summary from Linda's recent visit.     Please verify that you agree with the plan of care by signing the attached order.     If you have any questions or concerns, please do not hesitate to call.     I sincerely appreciate the opportunity to share in the care of one of your patients and hope to have another opportunity to work with you in the near future.       Sincerely,    Chevy Joyce, PT      Referring Provider:      I certify that I have read the below Plan of Care and certify the need for these services furnished under this plan of treatment while under my care.                    Lavon Martin PA-C  [address] [facility] [address]  Via Fax: [phone]              Today's date: 2024  Patient name: Linda Zimmerman  : 1960  MRN: [id number]  Referring provider: Lavon Martin PA-C  Dx:   Encounter Diagnosis     ICD-10-CM    1. Primary osteoarthritis of left knee  M17.12                Assessment  Assessment details: Linda Zimmerman is a 63 y.o. female who presents with pain, decreased strength, decreased ROM, decreased joint mobility, joint effusion, ambulatory dysfunction, and balance dysfunction. Due to these impairments, patient has difficulty performing ADL's, recreational activities, work-related activities, engaging in social activities, ambulation, stair negotiation, lifting/carrying, transfers. Patient's clinical presentation is consistent with their referring diagnosis of Primary osteoarthritis of left knee  (primary encounter diagnosis). Patient has been educated in post-op  contraindications / precautions, gait training, weight bearing status, home exercise program, and plan of care. Patient would benefit from skilled physical therapy services to address their aforementioned functional limitations and progress towards prior level of function and independence with home exercise program.     Impairments: abnormal gait, abnormal or restricted ROM, activity intolerance, impaired physical strength, lacks appropriate home exercise program, pain with function, weight-bearing intolerance, poor posture  and poor body mechanics  Functional limitations: STS, stairs, squat, gait, in/out bed/car, disturbed sleep, gardening   Prognosis: good  Prognosis details: + factors: high motivation levels, age   - factors: history of L knee arthroscopy/clotting issues    Goals  Short Term Goals to be accomplished by 3/20/24:  STG1: Pt will be I with HEP  STG2: Pt will demo 0-100 degrees in knee AROM to improve gait.   STG3: Pt will demo 4/5 MMT strength in knee to improve stair negotiation.   STG4: Pt will amb community distance without gait deviates due to pain     Long Term Goals to be accomplished by 5/15/24:   LTG1: Pt will be able to return to riding [employer] bike for cycling classes.  LTG2: Pt will be able to be able to perform squat/kneel for gardening.  LTG3: Pt will be able to walk on unstable surfaces to return to hiking.       Plan  Plan details: HEP development, stretching, strengthening, A/AA/PROM, joint mobilizations, posture education, STM/MI as needed to reduce muscle tension, muscle reeducation, PLOC discussed and agreed upon with patient.      Patient would benefit from: PT eval and skilled physical therapy  Planned modality interventions: cryotherapy, thermotherapy: hydrocollator packs and unattended electrical stimulation  Planned therapy interventions: manual therapy, neuromuscular re-education, self care, therapeutic activities, therapeutic exercise, home exercise program, patient  education, joint mobilization, balance, strengthening, stretching and therapeutic training  Frequency: 2x week  Duration in weeks: 12  Plan of Care beginning date: 2024  Plan of Care expiration date: 5/15/2024  Treatment plan discussed with: patient      Subjective Evaluation    History of Present Illness  Mechanism of injury: Pt is a 62 y/o female who presents to PT s/p L TKA performed on 24. She had home care PT for 2 days and then was d/c'd to outpatient PT.   Medication: not currently taking pain meds, Tylenol 2x daily.   Work: 1 day a week   Recreation: [employer], hiking, gardening  Medical history: arthroscopy L knee 10 years ago/clotting complications  Patient Goals  Patient goals for therapy: decreased pain, improved balance, increased motion, decreased edema, increased strength and independence with ADLs/IADLs  Patient goal: STS, stairs, squat, gait, in/out bed/car, disturbed sleep, gardening  Pain  Current pain ratin  At best pain ratin  At worst pain rating: 3  Location: L knee  Quality: tight, pulling, discomfort and throbbing  Relieving factors: relaxation and rest  Aggravating factors: lifting, overhead activity, stair climbing and walking    Treatments  Current treatment: physical therapy      Objective     Observations   Left Knee   Positive for edema.     Additional Observation Details  Incision: wound covered with bandage - told not to remove for 2 weeks, normal skin tone surrounding bandage    Active Range of Motion   Left Knee   Flexion: 75 degrees with pain  Extension: 22 degrees with pain    Passive Range of Motion   Left Knee   Flexion: 90 degrees with pain  Extension: 10 degrees with pain    Strength/Myotome Testing     Left Knee   Flexion: 3-  Extension: 3-    Right Knee   Flexion: 5  Extension: 5    Additional Strength Details  Knee extension lag:   L: 22 Degrees     Tests     Additional Tests Details  SLS: NOT TESTED   R: 3, 2, 3 sec  L: 30 sec        Precautions:   Past Medical History:   Diagnosis Date   • Breast lump    • Instability of knee joint    • Lateral epicondylitis    • Neoplasm of uncertain behavior of skin      Manuals 2/21        L knee PROM stretch                                    Neuro Re-Ed 2/21                                                                       Ther Ex 2/21        Seated knee ext stretch :30x         Seated knee flexion stretch :30x         LAQ  10x         SAQ          Quad set :10x2         Weight shift  10x :03                          Ther Activity 2/21        U/l LP          Step up

## 2024-02-29 ENCOUNTER — OFFICE VISIT (OUTPATIENT)
Dept: PHYSICAL THERAPY | Facility: CLINIC | Age: 64
End: 2024-02-29
Payer: COMMERCIAL

## 2024-02-29 DIAGNOSIS — M17.12 PRIMARY OSTEOARTHRITIS OF LEFT KNEE: Primary | ICD-10-CM

## 2024-02-29 PROCEDURE — 97110 THERAPEUTIC EXERCISES: CPT

## 2024-02-29 PROCEDURE — 97140 MANUAL THERAPY 1/> REGIONS: CPT

## 2024-02-29 NOTE — PROGRESS NOTES
"Daily Note     Today's date: 2024  Patient name: Linda Zimmerman  : 1960  MRN: [id number]  Referring provider: Lavon Martin PA-C  Dx:   Encounter Diagnosis     ICD-10-CM    1. Primary osteoarthritis of left knee  M17.12                    Subjective: Patient reports she is doing well.  Denies any pain and has been compliant with HEP.        Objective: See treatment diary below      Assessment: Tolerated treatment well. Patient demonstrated fatigue post treatment, exhibited good technique with therapeutic exercises, and would benefit from continued PT.  Progressed program with ROM and strengthening with good tolerance.  Knee flexion improved to 90 degrees actively.        Plan: Continue per plan of care.      Precautions:   Past Medical History:   Diagnosis Date    Breast lump     Instability of knee joint     Lateral epicondylitis     Neoplasm of uncertain behavior of skin      Manuals        L knee PROM stretch  ksg                                  Neuro Re-Ed                                                                       Ther Ex        Seated knee ext stretch :30x  10\" x10       Seated knee flexion stretch :30x  10\" x10       LAQ  10x  2x10       SAQ   2x10       Quad set :10x2  10\" x10       Weight shift  10x :03        Heel slides  10\" x10       Calf stretch on 1/2 foam roll standing  10\" x10       HR/TR  x20                         Supine hamstring stretch  30\" x3       Ther Activity        U/l LP          Step up         Bike  5'  Rocking                                                         "

## 2024-03-01 ENCOUNTER — HOSPITAL ENCOUNTER (INPATIENT)
Dept: HOSPITAL 99 - 4 EAST ACU | Age: 64
LOS: 1 days | Discharge: HOME HEALTH | DRG: 176 | End: 2024-03-02
Payer: COMMERCIAL

## 2024-03-01 VITALS — DIASTOLIC BLOOD PRESSURE: 77 MMHG | RESPIRATION RATE: 17 BRPM | SYSTOLIC BLOOD PRESSURE: 116 MMHG

## 2024-03-01 VITALS — DIASTOLIC BLOOD PRESSURE: 88 MMHG | SYSTOLIC BLOOD PRESSURE: 133 MMHG | RESPIRATION RATE: 20 BRPM

## 2024-03-01 VITALS — RESPIRATION RATE: 14 BRPM

## 2024-03-01 VITALS — RESPIRATION RATE: 18 BRPM | SYSTOLIC BLOOD PRESSURE: 121 MMHG | DIASTOLIC BLOOD PRESSURE: 67 MMHG

## 2024-03-01 VITALS — DIASTOLIC BLOOD PRESSURE: 68 MMHG | SYSTOLIC BLOOD PRESSURE: 114 MMHG | RESPIRATION RATE: 16 BRPM

## 2024-03-01 VITALS — RESPIRATION RATE: 18 BRPM | SYSTOLIC BLOOD PRESSURE: 137 MMHG | DIASTOLIC BLOOD PRESSURE: 80 MMHG

## 2024-03-01 VITALS — DIASTOLIC BLOOD PRESSURE: 79 MMHG | SYSTOLIC BLOOD PRESSURE: 123 MMHG | RESPIRATION RATE: 18 BRPM

## 2024-03-01 VITALS — RESPIRATION RATE: 17 BRPM

## 2024-03-01 VITALS — RESPIRATION RATE: 15 BRPM | DIASTOLIC BLOOD PRESSURE: 77 MMHG | SYSTOLIC BLOOD PRESSURE: 120 MMHG

## 2024-03-01 VITALS — RESPIRATION RATE: 16 BRPM | SYSTOLIC BLOOD PRESSURE: 117 MMHG | DIASTOLIC BLOOD PRESSURE: 75 MMHG

## 2024-03-01 VITALS — BODY MASS INDEX: 25.8 KG/M2 | BODY MASS INDEX: 25.7 KG/M2

## 2024-03-01 VITALS — SYSTOLIC BLOOD PRESSURE: 118 MMHG | DIASTOLIC BLOOD PRESSURE: 78 MMHG | RESPIRATION RATE: 16 BRPM

## 2024-03-01 VITALS — RESPIRATION RATE: 15 BRPM | DIASTOLIC BLOOD PRESSURE: 79 MMHG | SYSTOLIC BLOOD PRESSURE: 125 MMHG

## 2024-03-01 VITALS — RESPIRATION RATE: 15 BRPM

## 2024-03-01 VITALS — RESPIRATION RATE: 18 BRPM

## 2024-03-01 VITALS — RESPIRATION RATE: 22 BRPM

## 2024-03-01 DIAGNOSIS — Z90.710: ICD-10-CM

## 2024-03-01 DIAGNOSIS — J98.11: ICD-10-CM

## 2024-03-01 DIAGNOSIS — Z96.652: ICD-10-CM

## 2024-03-01 DIAGNOSIS — I51.9: ICD-10-CM

## 2024-03-01 DIAGNOSIS — Z86.718: ICD-10-CM

## 2024-03-01 DIAGNOSIS — I26.99: Primary | ICD-10-CM

## 2024-03-01 DIAGNOSIS — K44.9: ICD-10-CM

## 2024-03-01 LAB
ALBUMIN SERPL-MCNC: 3.9 G/DL (ref 3.5–5)
ALP SERPL-CCNC: 104 U/L (ref 38–126)
ALT SERPL-CCNC: 30 U/L (ref 0–35)
APTT PPP: 122.1 SEC (ref 23.4–35)
APTT PPP: 27.2 SEC (ref 23.4–35)
APTT PPP: 60.4 SEC (ref 23.4–35)
AST SERPL-CCNC: 24 U/L (ref 14–36)
BUN SERPL-MCNC: 14 MG/DL (ref 7–17)
BUN SERPL-MCNC: 16 MG/DL (ref 7–17)
CALCIUM SERPL-MCNC: 8.9 MG/DL (ref 8.4–10.2)
CALCIUM SERPL-MCNC: 9.4 MG/DL (ref 8.4–10.2)
CHLORIDE SERPL-SCNC: 103 MMOL/L (ref 98–107)
CHLORIDE SERPL-SCNC: 105 MMOL/L (ref 98–107)
CO2 SERPL-SCNC: 26 MMOL/L (ref 22–30)
CO2 SERPL-SCNC: 27 MMOL/L (ref 22–30)
EGFR: > 60
EGFR: > 60
ERYTHROCYTE [DISTWIDTH] IN BLOOD BY AUTOMATED COUNT: 13.6 % (ref 11.5–14.5)
ERYTHROCYTE [DISTWIDTH] IN BLOOD BY AUTOMATED COUNT: 13.6 % (ref 11.5–14.5)
ESTIMATED CREATININE CLEARANCE: 103 ML/MIN
ESTIMATED CREATININE CLEARANCE: 88 ML/MIN
GLUCOSE SERPL-MCNC: 114 MG/DL (ref 70–99)
GLUCOSE SERPL-MCNC: 98 MG/DL (ref 70–99)
HCT VFR BLD AUTO: 32.1 % (ref 37–47)
HCT VFR BLD AUTO: 33.3 % (ref 37–47)
HGB BLD-MCNC: 10.9 G/DL (ref 12–16)
HGB BLD-MCNC: 11.5 G/DL (ref 12–16)
INR PPP: 1.04
MAGNESIUM SERPL-MCNC: 2.1 MG/DL (ref 1.6–2.3)
MCHC RBC AUTO-ENTMCNC: 34 G/DL (ref 33–37)
MCHC RBC AUTO-ENTMCNC: 34.5 G/DL (ref 33–37)
MCV RBC AUTO: 84.5 FL (ref 81–99)
MCV RBC AUTO: 84.9 FL (ref 81–99)
NRBC BLD AUTO-RTO: 0 %
PLATELET # BLD AUTO: 369 10^3/UL (ref 130–400)
PLATELET # BLD AUTO: 394 10^3/UL (ref 130–400)
POTASSIUM SERPL-SCNC: 4.2 MMOL/L (ref 3.5–5.1)
POTASSIUM SERPL-SCNC: 4.2 MMOL/L (ref 3.5–5.1)
PROT SERPL-MCNC: 7 G/DL (ref 6.3–8.2)
PROTHROMBIN TIME: 13.7 SEC (ref 11.4–14.6)
SODIUM SERPL-SCNC: 134 MMOL/L (ref 135–145)
SODIUM SERPL-SCNC: 135 MMOL/L (ref 135–145)
TROPONIN I SERPL-MCNC: < 0.012 NG/ML
TROPONIN I SERPL-MCNC: < 0.012 NG/ML

## 2024-03-01 RX ADMIN — HEPARIN SODIUM 6300 UNITS: 5000 INJECTION, SOLUTION INTRAVENOUS; SUBCUTANEOUS at 16:27

## 2024-03-01 RX ADMIN — HEPARIN SODIUM 250: 10000 INJECTION, SOLUTION INTRAVENOUS at 20:16

## 2024-03-01 RX ADMIN — ACETAMINOPHEN 650 MG: 325 TABLET ORAL at 14:13

## 2024-03-01 RX ADMIN — HEPARIN SODIUM 250: 10000 INJECTION, SOLUTION INTRAVENOUS at 03:22

## 2024-03-01 RX ADMIN — HEPARIN SODIUM 6300 UNITS: 5000 INJECTION, SOLUTION INTRAVENOUS; SUBCUTANEOUS at 03:21

## 2024-03-01 RX ADMIN — ACETAMINOPHEN 650 MG: 325 TABLET ORAL at 20:15

## 2024-03-01 NOTE — HPS.HSE
"Family Physician"~"-"~"-: "~"Family Physician:  Digna Lamb"~"Chief Complaint"~"-"~"-: "~"Shortness of breath"~"History of Present Illness"~"-: "~"Patient is a pleasant 63 years old who had recent left knee replacement surgery 11 days ago and currently on aspirin for DVT prophylaxis came to the ER with shortness of breath, CTA chest done in the ER shows bilateral PE with right heart strain."~"Patient is hemodynamically stable, most recent blood pressure 116/77 with oxygen sat 98% on room air."~"BNP is negative."~"Discussed with pulmonologist via [facility] text, no candidate for tPA, will be admitted to telemetry floor."~"Started on heparin drip."~"Patient seen and examined at bedside,  at side, complaining of right-sided chest pain, still with mild shortness of breath, no abdominal pain, no nausea, no vomiting, no diarrhea or constipation."~"Patient will be admitted under hospitalist"~"Medical History"~"Past Medical History"~"Past Medical History: Reports Other"~"Additional Past Medical History: "~"DVT, post arthritis"~"Past Surgical History: Reports Other (Hysterectomy, knee replacement)"~"Social History"~"Tobacco: Non-smoker"~"Alcohol: None"~"Drug: None"~"Personal: "~"Living: With Family"~"Family History"~"Family History: Not pertinent"~"Allergies / Home Medications"~"-: "~"Allergies reflects when Allergies were last updated in [facility]."~"Home Medications with original date entered in [facility] "~"Allergy/Medication List: "~"Allergies"~"Allergy/AdvReac	Type	Severity	Reaction	Status	Date / Time"~"No Known Allergies	Allergy			Verified	03/01/24 01:25"~"Home Medications"~"No Meds [No Current Medications]  03/01/24 "~"NSAIDs"~"Review of Systems"~"-"~"A 12 point ROS was completed and negative except as noted: Yes"~"Constitutional: Reports Fatigue; Denies Fever, Weight Gain, Weight Loss or Sleep Disturbance"~"EENT: Denies Tearing, Sore Throat, Mouth Pain, Mouth Swelling or Runny Nose"~"Respiratory: Reports Trouble Breathing; Denies Cough or Hemoptysis"~"Cardiac: Reports Chest Pain; Denies Diaphoresis, Palpitations or Syncope"~"Abdomen/GI: Denies Abdominal Pain, Nausea, Vomiting, Diarrhea, Constipated, Bloody Stools or Black Stools"~": Denies Dysuria, Frequency, Flank Pain, Incontinence, Difficulty Voiding, Urgency, Bleeding or Dark Urine"~"Musculoskeletal: Denies Joint Pain, Joint Swelling, Muscle Pain, Muscle Stiffness or Edema"~"Skin: Denies Itching or Rash"~"Neurological: Denies Dizzy, Headache, Weakness or Numbness"~"Endocrine: Denies Polyuria, Polydipsia or Temp Intolerance"~"Hematologic/Lymphatic: Denies Bleeding, Swollen Glands or Bruising"~"Psych: Reports Calm; Denies Depression, Anxiety or Panic Disorder"~"Physical Exam"~"Vital Signs"~"-: "~"Vital Signs"~"Temp	Pulse	Resp	BP	Pulse Ox"~" 98.1 F 	 91 	 17 	 116/77 	 98 "~" 03/01/24 01:25	 03/01/24 02:04	 03/01/24 02:04	 03/01/24 02:04	 03/01/24 02:04"~"Physical Exam"~"General: Well Developed, Well Nourished, No Apparent Distress, Comfortable and Good Appetite; No Pain, Chills or Sweats"~"HEENT: NormoCephalic, Moist mucous membranes, Atraumatic, Good Dentition, PERRLA, Nose Appears Normal and Ears Appear Normal"~"Respiratory: Clear"~"Cardiac: S1/S2 and Regular Rhythm"~"Breast: Deferred by me"~"GI: Soft, Non Tender, Non Distended and Normal Bowel Sounds"~"Genito-urinary: Deferred by me"~"Musculoskeletal: No Clubbing, No Cyanosis, No Edema and Other (Left knee surgery scar is clean with no sign of infection or bleeding)"~"Skin: Warm; No Rash, Jaundice, Ulcers, Lesions or Decubitus Ulcers"~"Neuro: Awake, Alert, Oriented, AO x 3, No Motor Deficits, Nonfocal/grossly intact and Cranial Nerves Intact"~"Hematologic/Lymphatic: No Lymphadenopathy"~"Psych: Calm"~"Laboratory Results"~"-"~"-: "~"03/01/24 01:46 "~"03/01/24 01:46 "~"Laboratory Results"~"PT	 13.7 Sec (11.4-14.6)  	03/01/24 01:46    "~"INR	 1.04  	03/01/24 01:46    "~"APTT	 27.2 Sec (23.4-35.0)  	03/01/24 01:46    "~"Total Bilirubin	 0.6 mg/dl (0.2-1.3)  	03/01/24 01:46    "~"AST	 24 U/L (14-36)  	03/01/24 01:46    "~"ALT	 30 U/L (0-35)  	03/01/24 01:46    "~"Alkaline Phosphatase	 104 U/L ()  	03/01/24 01:46    "~"Troponin I	 &lt; 0.012 ng/ml 	03/01/24 01:46    "~"Data Reviewed"~"-"~"Diagnostic Radiology: Report Reviewed by me"~"CT Scan: Report Reviewed by me"~"Medical Tests (Nuc Med, Echo, EKG etc): Report Reviewed by me"~"Lab Data: Labs Reviewed by me"~"Old Records: Reviewed"~"Impression/Plan"~"-"~"-: "~"IMPRESSION:"~"Patient 63 years old with recent left knee replacement came to the ER with chest pain or shortness of breath found to have bilateral PE with right heart strain, hemodynamically stable, started on heparin drip, echo morning, pulmonary consult."~"PLAN: "~"Acute bilateral pulm embolism with right heart strain."~"Provoked pulm embolism after left knee surgery."~"Patient with history of DVT in the past 10 years ago when she had knee surgery and she used to be on Lovenox."~"Had a hysterectomy and she was on Lovenox for DVT prophylaxis."~"Had left knee replaced surgery 11 days ago, and she was taking aspirin"~"Presented to the ER with shortness of breath and chest pain."~"Found to have bilateral pulmonary embolism with right heart strain."~"BNP is negative."~"Discussed via [employer] text with pulmonologist, no candidate for tPA."~"Echocardiogram in a.m."~"Switch to NOAC before discharge"~"Status post left total knee replacement."~"Clean left knee scar."~"Continue physical therapy as outpatient"~"CODE STATUS: Full code"~"DVT prophylaxis: Heparin drip"~"Diet: Regular diet"

## 2024-03-01 NOTE — PTCARENOTE
Patient admitted to room 409-2, ambulatory from stretcher to bed, gait steady. IV heparin infusing at 14c/hr, PTT to be drawn at 0920. Patient complains of some mild right lateral chest wall pain. Oriented to unit. Call bell in reach.

## 2024-03-01 NOTE — CM
"Addendum entered by Ramya Cope  03/01/24 16:10: "~"update provided to physician."~"Original Note:"~"Patient seen at bedside with physician. Patient stated that she lives with  in a 2 story home.Patient has a walker, and a cane.  Patient PCP is Dr. Kern, and she uses a [facility] in [place].  Patient is independent of ADL's and IADL's, but had "~"surgery 2/19/24 and just returned to outpatient therapy.  Patient may need Eliquis at discharge, cost for Eliquis 5mg BID is 314.23 or Dabigatran etexilate 150 mg 20$. CM will continue to follow for discharge planning.  "~"Plan; home with no needs vs home with VN  "

## 2024-03-01 NOTE — W.PN.HOSP.TC
"Addendum entered and electronically signed by Elaina Cardona MD  03/01/24 16:11: "~"Pradaxa is $20 vs Eliquis which is $314 both for 30 days.  Would use Pradaxa. "~"Original Note:"~"Today's Communication/Plan"~"-"~"-: "~"cont IV heparin drip"~"await echo"~"Assessment / Plan"~"Assessment / Plan"~"-: "~"pt is a 63 year old female"~"Acute bilateral pulm embolism with right heart strain--Provoked pulm embolism after left knee surgery (Patient with history of DVT in the past 10 years ago when she had knee surgery and she used to be on Lovenox)--cont IV heparin for now--await pulm "~"and echo--will price Eliquis"~"Status post left total knee replacement--Clean left knee scar--Continue physical therapy as outpatient"~"CODE STATUS: Full code"~"DVT prophylaxis: Heparin drip"~"Anticipated Discharge: &gt; 48 hours"~"Subjective/Interval History"~"-"~"-: "~"Date of Service:  March 1, 2024"~"pt c/o right sided chest pain"~"Objective Data"~"-"~"Labs: "~"Laboratory Results"~" 	03/01/24	03/01/24	03/01/24"~" 	01:46	07:03	09:13"~"WBC	 11.3 H	 11.2 H	"~"Hgb	 11.5 L	 10.9 L	"~"Hct	 33.3 L	 32.1 L	"~"Plt Count	 394	 369	"~"PT	 13.7		"~"INR	 1.04		"~"APTT	 27.2		 122.1 H"~"Sodium	 135	 134 L	"~"Potassium	 4.2	 4.2	"~"Chloride	 103	 105	"~"Carbon Dioxide	 27	 26	"~"BUN	 16	 14	"~"Creatinine	 0.7	 0.6	"~"Glucose	 114 H	 98	"~"Calcium	 9.4	 8.9	"~"Total Bilirubin	 0.6		"~"AST	 24		"~"ALT	 30		"~"Alkaline Phosphatase	 104		"~" 	03/01/24"~" 	16:00"~"WBC	"~"Hgb	"~"Hct	"~"Plt Count	"~"PT	"~"INR	"~"APTT	 Pending"~"Sodium	"~"Potassium	"~"Chloride	"~"Carbon Dioxide	"~"BUN	"~"Creatinine	"~"Glucose	"~"Calcium	"~"Total Bilirubin	"~"AST	"~"ALT	"~"Alkaline Phosphatase	"~"Vital Signs: "~"max temp for 24 hours"~"	03/01/24"~"01:25"~"Temp	98.1 F"~"Vital Signs"~"Temp	Pulse	Resp	BP	Pulse Ox"~" 97.9 F 	 98 	 16 	 117/75 	 96 "~" 03/01/24 08:00	 03/01/24 08:00	 03/01/24 08:00	 03/01/24 08:00	 03/01/24 08:30"~"Review of Systems"~"-"~"All other systems: Reviewed and negative"~"Cardiac: Reports Chest Pain"~"Physical Exam"~"-"~"General: Well Developed, Well Nourished and No Apparent Distress"~"HEENT: Normocephalic and Atraumatic"~"Respiratory: Clear to Auscultation; Negative Wheezes or Rhonchi"~"Cardiac: Regular Rhythm and S1/S2; Negative Murmur"~"GI: Soft, Nontender, Nondistended and Normal Bowel Sounds"~"Musculoskeletal: No Clubbing, No Cyanosis and Other (post op changes to left knee--no calf pain or swelling)"~"Neuro: Awake"~"Psych: Calm"

## 2024-03-02 VITALS — SYSTOLIC BLOOD PRESSURE: 115 MMHG | RESPIRATION RATE: 17 BRPM | DIASTOLIC BLOOD PRESSURE: 78 MMHG

## 2024-03-02 VITALS — DIASTOLIC BLOOD PRESSURE: 78 MMHG | SYSTOLIC BLOOD PRESSURE: 109 MMHG | RESPIRATION RATE: 16 BRPM

## 2024-03-02 VITALS — RESPIRATION RATE: 16 BRPM | SYSTOLIC BLOOD PRESSURE: 126 MMHG | DIASTOLIC BLOOD PRESSURE: 60 MMHG

## 2024-03-02 LAB
APTT PPP: 79.5 SEC (ref 23.4–35)
APTT PPP: > 200 SEC (ref 23.4–35)
BUN SERPL-MCNC: 17 MG/DL (ref 7–17)
CALCIUM SERPL-MCNC: 9.2 MG/DL (ref 8.4–10.2)
CHLORIDE SERPL-SCNC: 101 MMOL/L (ref 98–107)
CO2 SERPL-SCNC: 27 MMOL/L (ref 22–30)
EGFR: > 60
ERYTHROCYTE [DISTWIDTH] IN BLOOD BY AUTOMATED COUNT: 13.8 % (ref 11.5–14.5)
ESTIMATED CREATININE CLEARANCE: 88 ML/MIN
GLUCOSE SERPL-MCNC: 111 MG/DL (ref 70–99)
HCT VFR BLD AUTO: 33.1 % (ref 37–47)
HGB BLD-MCNC: 11.1 G/DL (ref 12–16)
MAGNESIUM SERPL-MCNC: 2.2 MG/DL (ref 1.6–2.3)
MCHC RBC AUTO-ENTMCNC: 33.5 G/DL (ref 33–37)
MCV RBC AUTO: 86.9 FL (ref 81–99)
PLATELET # BLD AUTO: 396 10^3/UL (ref 130–400)
POTASSIUM SERPL-SCNC: 4 MMOL/L (ref 3.5–5.1)
SODIUM SERPL-SCNC: 135 MMOL/L (ref 135–145)

## 2024-03-02 RX ADMIN — DABIGATRAN ETEXILATE MESYLATE 150 MG: 150 CAPSULE ORAL at 11:21

## 2024-03-02 RX ADMIN — ACETAMINOPHEN 650 MG: 325 TABLET ORAL at 05:55

## 2024-03-02 NOTE — CM
"Chart reviewed and spoke with physician and patient is for discharge to home today, patient has been set up with [facility]."~"Plan; Home with [facility]."

## 2024-03-02 NOTE — W.PN.UPDATE
"Update Note"~"Progress Note Update"~"-: "~"Patient seen and evaluated this morning by orthopedic surgery.  She is status post left total knee replacement performed on 2/14/2024 under the direction of Dr. Taylor. She is currently admitted for acute bilateral pulmonary embolism with right "~"heart strain.  She is currently on a heparin drip, anticipated to be discharged on Pradaxa.  Orthopedically, patient is doing well and denies any complaints of pain about her left knee.  She is currently in outpatient physical therapy 1 time per "~"week, however is also performing exercises learned in outpatient physical therapy while admitted.  Incision is clean, dry, and intact. No erythema.  Range of motion 0 to just shy of 90 degrees.  Ecchymosis about left lower extremity as expected "~"post-operatively.  Calf is soft and nontender to palpation.  Appreciate medical and pulmonary teams.  Patient tentatively scheduled for follow-up this upcoming Monday as an outpatient as long as medically stable for discharge."

## 2024-03-02 NOTE — W.PN.HOSP.TC
"Today's Communication/Plan"~"-"~"-: "~"d/c"~"Assessment / Plan"~"Assessment / Plan"~"-: "~"pt is a 63 year old female"~"Acute bilateral pulm embolism with right heart strain--Provoked pulm embolism after left knee surgery (Patient with history of DVT in the past 10 years ago when she had knee surgery and she used to be on Lovenox)--cont IV heparin for now--await pulm "~"and echo--start pradaxa (cost acceptable)"~"Status post left total knee replacement--Clean left knee scar--Continue physical therapy as outpatient"~"CODE STATUS: Full code"~"DVT prophylaxis: Heparin drip"~"ok for d/c"~"Anticipated Discharge: Today"~"Subjective/Interval History"~"-"~"-: "~"Date of Service:  March 2, 2024"~"pt ready for d/c--starting Pradaxa and stopping IV heparin drip"~"Objective Data"~"-"~"Labs: "~"Laboratory Results"~" 	03/01/24	03/02/24	03/02/24"~" 	22:40	08:14	14:30"~"WBC		 12.3 H	"~"Hgb		 11.1 L	"~"Hct		 33.1 L	"~"Plt Count		 396	"~"APTT	 &gt; 200 H*	 79.5 H	 Cancelled"~"Sodium		 135	"~"Potassium		 4.0	"~"Chloride		 101	"~"Carbon Dioxide		 27	"~"BUN		 17	"~"Creatinine		 0.7	"~"Glucose		 111 H	"~"Calcium		 9.2	"~"Vital Signs: "~"max temp for 24 hours"~"	03/01/24"~"15:47"~"Temp	98.2 F"~"Vital Signs"~"Temp	Pulse	Resp	BP	Pulse Ox"~" 98 F 	 106 	 16 	 126/60 	 95 "~" 03/02/24 10:53	 03/02/24 10:53	 03/02/24 10:53	 03/02/24 10:53	 03/02/24 10:53"~"I&O"~"	03/01/24	03/02/24	03/03/24"~"	06:59	06:59	06:59"~"Intake Total		180 / 180	"~"Balance		180 / 180	"~"Review of Systems"~"-"~"All other systems: Reviewed and negative"~"Physical Exam"~"-"~"General: Well Developed, Well Nourished and No Apparent Distress"~"HEENT: Normocephalic and Atraumatic"~"Respiratory: Clear to Auscultation; Negative Wheezes or Rhonchi"~"Cardiac: Regular Rhythm and S1/S2; Negative Murmur"~"GI: Soft, Nontender, Nondistended and Normal Bowel Sounds"~"Musculoskeletal: No Clubbing, No Cyanosis, No Edema and Other (post knee replacement)"~"Neuro: Awake"

## 2024-03-02 NOTE — W.PN.PUL3
"Today's Communication / Plan"~"-"~"-: "~"Start NOAC (pradaxa)"~"Awaiting DC home"~"Outpatient pulmonary office follow-up will be arranged"~"No need for repeat TTE given her echo appear normal during this hospitalization with normal RV size/function and PASP 24 mmHg"~"Pulmonary service will now sign off.  Please reconsult if there are any additional questions or concerns."~"Assessment"~"-"~"-: "~"63-year-old woman who underwent left knee replacement 10 days ago, was placed on aspirin for prophylaxis.  Comes complaining of right-sided chest pain and shortness of breath.  CT chest demonstrated small burden bilateral pulmonary embolism."~"Acute bilateral pulmonary gvrjnoae-uxvwgkgf-ogwuhl post left knee replacement about 10 days ago."~"	CT angiogram of the chest 3/1/2024 reviewed: small-volume bilateral pulmonary embolism.  Peripheral areas of attenuation likely subsegmental atelectasis.  Small hiatal hernia."~"	Negative troponins"~"	Negative proBNP"~"	Echocardiogram: Normal LVEF.  Normal right ventricular size and function"~"	Hemodynamically stable without oxygen requirements.  "~"Possible right-sided pulmonary infarct.  Complaining of pleuritic type chest pain."~"Conditions present prior admission:"~"DVT several years ago after left knee surgery in the past.  She was on 3 months of anticoagulation at that time."~"History of hysterectomy she was placed on Lovenox at that time for DVT prophylaxis"~"Conditions present on admission:"~"Prior hysterectomy"~"Prior knee replacement"~"History of DVT during knee replacement in the past.  "~"Never smoker"~"No family history of thromboembolic disease"~"Denies family history of blood disorders"~"Patient reports being up-to-date with cancer screening"~"Assessment and plan:"~"Provoked small burden pulmonary embolism."~"Complaining of right-sided pleuritic type chest pain.  Likely from pulmonary infarct."~"Transition to NOAC today.  Patient being transition to Pradaxa. "~"normal echocardiogram, no oxygen requirements, small volume bilateral pulmonary embolism, no hemodynamic instability.  Only complaining of significant right-sided chest pain. "~"Lower extremity duplex was not performed"~"-"~"Will need 3 months of anticoagulation as long as she is ambulatory."~"-"~"CT chest:  right-sided, pleural-based peripheral opacity suspect pulmonary infarct..  Recommend repeating CT chest without contrast in about 3 months for now."~"Recommend outpatient pulmonary follow-up in about 4 to 6 weeks."~"-"~"Dr. Grigsby updated patient in detail at the bedside 3/1/2024."~"Agreeable to follow-up in the outpatient setting."~"Information left in the chart."~"Patient awaiting discharge home today.  Pulmonary service will now sign off.  Thank you for allowing us to be involved in the care of this patient.  Please reconsult if there are any additional questions or concerns."~"Subjective Data"~"-"~"Date of Service: "~"Date of Service:  March 2, 2024"~"Chief Complaint: Pulmonary Follow Up"~"Subjective: "~"Patient seen and evaluated today at bedside.  On room air saturating 95%.  Awaiting discharge home with home care.  Patient denies any chest pain, abdominal pain, headache, fevers or chills."~"Review of Systems"~"General: Other (Negative unless mentioned above)"~"Objective Data"~"Data Reviewed"~"Vital Signs / I&O / Oxygen: "~"Vital Signs"~"Temp	Pulse	Resp	BP	Pulse Ox"~" 98 F 	 106 	 16 	 126/60 	 95 "~" 03/02/24 10:53	 03/02/24 10:53	 03/02/24 10:53	 03/02/24 10:53	 03/02/24 10:53"~"Intake and Output"~"	03/01/24	03/02/24	03/03/24"~"	06:59	06:59	06:59"~"Intake Total		180 / 180	"~"Balance		180 / 180	"~"SaO2                          	95                                              	"~"Physical Exam"~"General: Comfortable"~"HEENT: Normocephalic and Anicteric"~"Cardiovascular: S1-S2 and Peripheral Edema (n)"~"Respiratory: Clear, Wheeze (n) and Non-Labored Respirations"~"GI: Soft and Non Distended"~"Neurology: Awake and No Motor Deficits"~"Skin: Warm and Dry"~"Labs/Micro/Reports"~"-: "~"Lab Data"~"03/02/24 08:14 "~"03/02/24 08:14 "~"Laboratory Results"~" 	03/01/24	03/02/24	03/02/24"~" 	22:40	08:14	14:30"~"APTT	 &gt; 200 H*	 79.5 H	 Cancelled"

## 2024-03-04 ENCOUNTER — TELEPHONE (OUTPATIENT)
Dept: FAMILY MEDICINE CLINIC | Facility: CLINIC | Age: 64
End: 2024-03-04

## 2024-03-04 NOTE — TELEPHONE ENCOUNTER
Patient called to schedule TCM on Wednesday 3/5/24.    Requested records from [facility] Records by fax on 03/04/24.

## 2024-03-05 ENCOUNTER — HOSPITAL ENCOUNTER (OUTPATIENT)
Dept: MAMMOGRAPHY | Facility: IMAGING CENTER | Age: 64
Discharge: HOME | End: 2024-03-05
Payer: COMMERCIAL

## 2024-03-05 ENCOUNTER — HOSPITAL ENCOUNTER (OUTPATIENT)
Dept: BONE DENSITY | Facility: IMAGING CENTER | Age: 64
Discharge: HOME | End: 2024-03-05
Payer: COMMERCIAL

## 2024-03-05 ENCOUNTER — OFFICE VISIT (OUTPATIENT)
Dept: PHYSICAL THERAPY | Facility: CLINIC | Age: 64
End: 2024-03-05
Payer: COMMERCIAL

## 2024-03-05 VITALS — BODY MASS INDEX: 27.65 KG/M2 | WEIGHT: 176.2 LBS | HEIGHT: 67 IN

## 2024-03-05 DIAGNOSIS — Z78.0 ASYMPTOMATIC MENOPAUSAL STATE: ICD-10-CM

## 2024-03-05 DIAGNOSIS — Z12.31 ENCOUNTER FOR SCREENING MAMMOGRAM FOR MALIGNANT NEOPLASM OF BREAST: ICD-10-CM

## 2024-03-05 DIAGNOSIS — M17.12 PRIMARY OSTEOARTHRITIS OF LEFT KNEE: Primary | ICD-10-CM

## 2024-03-05 PROCEDURE — 77063 BREAST TOMOSYNTHESIS BI: CPT

## 2024-03-05 PROCEDURE — 77080 DXA BONE DENSITY AXIAL: CPT

## 2024-03-05 PROCEDURE — 97140 MANUAL THERAPY 1/> REGIONS: CPT

## 2024-03-05 PROCEDURE — 77067 SCR MAMMO BI INCL CAD: CPT

## 2024-03-05 PROCEDURE — 97110 THERAPEUTIC EXERCISES: CPT

## 2024-03-05 NOTE — PROGRESS NOTES
"Daily Note     Today's date: 3/5/2024  Patient name: Linda Zimmerman  : 1960  MRN: [id number]  Referring provider: Lavon Martin PA-C  Dx:   Encounter Diagnosis     ICD-10-CM    1. Primary osteoarthritis of left knee  M17.12           Start Time: 1030  Stop Time: 1115  Total time in clinic (min): 45 minutes    Subjective: Patient states that she was in the hospital from Thursday- Saturday. Patient states that she was cleared for PT. She had visit with ortho yesterday whio was pleased with her healing.  She is currently on Dagbigatran Etexilate. She states that she since has been doing yard work.      Objective: See treatment diary below      Assessment: Tolerated treatment well. Care taken t/o program to prevent SOB or elevated HR 2* to subjective. Improved flex/ extension noted post visit. Patient demonstrated fatigue post treatment and would benefit from continued PT      Plan: Continue per plan of care.        Diagnosis:  TKA  Left   Precautions:  Surgery date  24  PE in both lungs 2024  Hx of DVT   Comparable signs 1)   2)  3)    Primary Impairments: 1)   2)   Patient Goals STS, stairs, squat, gait, in/out bed/car, disturbed sleep, gardening       Precautions:   Past Medical History:   Diagnosis Date    Breast lump     Instability of knee joint     Lateral epicondylitis     Neoplasm of uncertain behavior of skin      Manuals  3/5      L knee PROM stretch  ksg LQ                                 Neuro Re-Ed  3/5                                                                     Ther Ex  35      Seated knee ext stretch :30x  10\" x10       Seated knee flexion stretch :30x  10\" x10       Standing knee flex/ extension stretch   30\"x3      LAQ  10x  2x10 0# 2x10, 3# 2x10 ea      SAQ   2x10       Quad set :10x2  10\" x10 Standing against wall Blue ball 3\", 2x10      Weight shift  10x :03        Heel slides  10\" x10       Calf stretch on 1/2 foam roll standing  10\" " "x10       HR/TR  x20 HR on Step  TR against wall  3x10 ea       SLR   3x10 2#      Prone Hang   3 min 3#      Supine hamstring stretch  30\" x3       Ther Activity 2/21 2/29       U/l LP          Step up   4 in/x10  6in/x10      Bike  5'  Rocking NV                                                          "

## 2024-03-05 NOTE — VNURNOTE
"[facility] intake requested follow up with patient today."~"Call to patient who was on her way to [facility] PT."~"No [facility] referral made."

## 2024-03-06 ENCOUNTER — OFFICE VISIT (OUTPATIENT)
Dept: FAMILY MEDICINE CLINIC | Facility: CLINIC | Age: 64
End: 2024-03-06
Payer: COMMERCIAL

## 2024-03-06 VITALS
HEIGHT: 69 IN | OXYGEN SATURATION: 98 % | TEMPERATURE: 98.1 F | SYSTOLIC BLOOD PRESSURE: 122 MMHG | BODY MASS INDEX: 25.33 KG/M2 | HEART RATE: 89 BPM | WEIGHT: 171 LBS | DIASTOLIC BLOOD PRESSURE: 84 MMHG

## 2024-03-06 DIAGNOSIS — K44.9 GASTROESOPHAGEAL REFLUX DISEASE WITH HIATAL HERNIA: ICD-10-CM

## 2024-03-06 DIAGNOSIS — I26.99 BILATERAL PULMONARY EMBOLISM (HCC): Primary | ICD-10-CM

## 2024-03-06 DIAGNOSIS — Z96.652 STATUS POST TOTAL LEFT KNEE REPLACEMENT: ICD-10-CM

## 2024-03-06 DIAGNOSIS — K21.9 GASTROESOPHAGEAL REFLUX DISEASE WITH HIATAL HERNIA: ICD-10-CM

## 2024-03-06 DIAGNOSIS — Z86.718 HISTORY OF DVT (DEEP VEIN THROMBOSIS): ICD-10-CM

## 2024-03-06 DIAGNOSIS — K21.9 GASTROESOPHAGEAL REFLUX DISEASE, UNSPECIFIED WHETHER ESOPHAGITIS PRESENT: ICD-10-CM

## 2024-03-06 PROBLEM — M25.462 EFFUSION OF LEFT KNEE: Status: RESOLVED | Noted: 2021-04-29 | Resolved: 2024-03-06

## 2024-03-06 PROCEDURE — 99214 OFFICE O/P EST MOD 30 MIN: CPT | Performed by: NURSE PRACTITIONER

## 2024-03-06 NOTE — ASSESSMENT & PLAN NOTE
Continue with pradaxa, minimum 90 days  Provoked PE s/p LTKR on 2/19/24 however has a history of DVTs in past after hysterectomy and knee surgery  Follow up with pulmonary  Recommend consultation with hematology  Check US duplex lower extremities

## 2024-03-06 NOTE — ASSESSMENT & PLAN NOTE
S/p hysterectomy and knee surgery in past  Now with PE, unknown source, ordered US duplex  Referral to hematology for consultation

## 2024-03-06 NOTE — ASSESSMENT & PLAN NOTE
Hiatal hernia  With heartburn  Recommend consultation with GI for possible EGD  Use pepcid or prilosec as directed  Avoid large meals, remain upright for 2 hours after meals  Avoid acidic foods
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
## 2024-03-06 NOTE — ASSESSMENT & PLAN NOTE
Healing well, has great movement and mobility  No longer using assistive device  Pain is controlled, not requiring medications  Knows to use only tylenol for pain due to blood thinner  Continue with PT

## 2024-03-07 ENCOUNTER — TELEPHONE (OUTPATIENT)
Dept: HEMATOLOGY ONCOLOGY | Facility: CLINIC | Age: 64
End: 2024-03-07

## 2024-03-07 NOTE — TELEPHONE ENCOUNTER
I called Linda in response to a referral that was received for patient to establish care with Hematology.     Outreach was made to schedule a consultation.    Patient's voicemail is full and unable to accept messages at this time. Another attempt will be made to contact patient.

## 2024-03-13 ENCOUNTER — OFFICE VISIT (OUTPATIENT)
Dept: PHYSICAL THERAPY | Facility: CLINIC | Age: 64
End: 2024-03-13
Payer: COMMERCIAL

## 2024-03-13 ENCOUNTER — TELEPHONE (OUTPATIENT)
Age: 64
End: 2024-03-13

## 2024-03-13 DIAGNOSIS — M17.12 PRIMARY OSTEOARTHRITIS OF LEFT KNEE: Primary | ICD-10-CM

## 2024-03-13 PROCEDURE — 97110 THERAPEUTIC EXERCISES: CPT | Performed by: PHYSICAL THERAPIST

## 2024-03-13 PROCEDURE — 97530 THERAPEUTIC ACTIVITIES: CPT | Performed by: PHYSICAL THERAPIST

## 2024-03-13 PROCEDURE — 97140 MANUAL THERAPY 1/> REGIONS: CPT | Performed by: PHYSICAL THERAPIST

## 2024-03-13 NOTE — TELEPHONE ENCOUNTER
Attempted to schedule patient per pulm referral, patient said she is going to follow up with her pulm dr in [place]

## 2024-03-13 NOTE — PROGRESS NOTES
"Daily Note     Today's date: 3/13/2024  Patient name: Linda Zimmerman  : 1960  MRN: [id number]  Referring provider: Lavon Martin PA-C  Dx:   Encounter Diagnosis     ICD-10-CM    1. Primary osteoarthritis of left knee  M17.12              Subjective: Pt reported that \"I really am getting better as PT continues.\"     Objective: See treatment diary below    Assessment: Tolerated treatment well. Patient demonstrated fatigue post treatment, exhibited good technique with therapeutic exercises, and would benefit from continued PT    Plan: Continue per plan of care.      Diagnosis:  TKA  Left   Precautions:  Surgery date  24  PE in both lungs 2024  Hx of DVT   Comparable signs 1)   2)  3)    Primary Impairments: 1)   2)   Patient Goals STS, stairs, squat, gait, in/out bed/car, disturbed sleep, gardening       Precautions:   Past Medical History:   Diagnosis Date    Breast lump     Instability of knee joint     Lateral epicondylitis     Neoplasm of uncertain behavior of skin      Manuals 2/21 2/29 3/5 3/13     L knee PROM stretch  ksg LQ JZ                                Neuro Re-Ed 2/21 2/29 3/5 3/13                                                                    Ther Ex 2/21 2/29 3/5 3/13     Seated knee ext stretch :30x  10\" x10  :30x3     Stand knee flexion stretch step :30x  10\" x10  14\" box :30x3              LAQ  10x  2x10 0# 2x10, 3# 2x10 ea Isometric 3# :30x3      SAQ   2x10       Calf stretch on 1/ foam roll standing  10\" x10                                                    Educated on HEP    5'               Ther Activity 2/21 2/29  3/13     U/l LP     Seat4  30/ 3x10 ea     Step up   4 in/x10  6in/x10 6\" 2x10 ea     Forward step down    2\" 2UE assist  2x10 ea     Bike  5'  Rocking NV Full ROM 5'                                                            "

## 2024-03-20 ENCOUNTER — OFFICE VISIT (OUTPATIENT)
Dept: PHYSICAL THERAPY | Facility: CLINIC | Age: 64
End: 2024-03-20
Payer: COMMERCIAL

## 2024-03-20 DIAGNOSIS — M17.12 PRIMARY OSTEOARTHRITIS OF LEFT KNEE: Primary | ICD-10-CM

## 2024-03-20 PROCEDURE — 97110 THERAPEUTIC EXERCISES: CPT | Performed by: PHYSICAL THERAPIST

## 2024-03-20 PROCEDURE — 97140 MANUAL THERAPY 1/> REGIONS: CPT | Performed by: PHYSICAL THERAPIST

## 2024-03-20 PROCEDURE — 97530 THERAPEUTIC ACTIVITIES: CPT | Performed by: PHYSICAL THERAPIST

## 2024-03-20 NOTE — PROGRESS NOTES
"Daily Note     Today's date: 3/20/2024  Patient name: Linda Zimmerman  : 1960  MRN: [id number]  Referring provider: Lavon Martin PA-C  Dx:   Encounter Diagnosis     ICD-10-CM    1. Primary osteoarthritis of left knee  M17.12              Subjective: Pt reported that she continues to improve as sessions continue.     Objective: See treatment diary below    Assessment: Tolerated treatment well. Patient demonstrated fatigue post treatment, exhibited good technique with therapeutic exercises, and would benefit from continued PT    Plan: Continue per plan of care.      Diagnosis:  TKA  Left   Precautions:  Surgery date  24  PE in both lungs 2024  Hx of DVT   Comparable signs 1)   2)  3)    Primary Impairments: 1)   2)   Patient Goals STS, stairs, squat, gait, in/out bed/car, disturbed sleep, gardening       Precautions:   Past Medical History:   Diagnosis Date    Breast lump     Instability of knee joint     Lateral epicondylitis     Neoplasm of uncertain behavior of skin      Manuals 2/21 2/29 3/5 3/13 3/20    L knee PROM stretch  ksg LQ JZ JZ                               Neuro Re-Ed 2/21 2/29 3/5 3/13 3/20                                                                   Ther Ex 2/21 2/29 3/5 3/13 3/20    Seated knee ext stretch :30x  10\" x10  :30x3 :30x3     Stand knee flexion stretch step :30x  10\" x10  14\" box :30x3 14\" box :30x3              LAQ  10x  2x10 0# 2x10, 3# 2x10 ea Isometric 3# :30x3  Isometric 3# :30  7# :30x2    Stand knee flexion     3# 3x10     Calf stretch on  foam roll standing  10\" x10                                                    Educated on HEP    5'               Ther Activity 2/21 2/29  3/13 3/20    U/l LP     Seat4  30/ 3x10 ea Seat4   30/ 3x10     Step up   4 in/x10  6in/x10 6\" 2x10 ea 8\" 3x10 ea (too high - anterior knee pain) 6\"    Forward step down    2\" 2UE assist  2x10 ea 4\" 2UE assist  2x10 ea    Bike  5'  Rocking NV Full ROM 5'  5' lvl1           "

## 2024-03-27 ENCOUNTER — OFFICE VISIT (OUTPATIENT)
Dept: PHYSICAL THERAPY | Facility: CLINIC | Age: 64
End: 2024-03-27
Payer: COMMERCIAL

## 2024-03-27 ENCOUNTER — TELEPHONE (OUTPATIENT)
Dept: GASTROENTEROLOGY | Facility: CLINIC | Age: 64
End: 2024-03-27

## 2024-03-27 ENCOUNTER — CONSULT (OUTPATIENT)
Dept: GASTROENTEROLOGY | Facility: CLINIC | Age: 64
End: 2024-03-27
Payer: COMMERCIAL

## 2024-03-27 VITALS
HEIGHT: 69 IN | DIASTOLIC BLOOD PRESSURE: 75 MMHG | SYSTOLIC BLOOD PRESSURE: 129 MMHG | BODY MASS INDEX: 26.48 KG/M2 | WEIGHT: 178.8 LBS

## 2024-03-27 DIAGNOSIS — M17.12 PRIMARY OSTEOARTHRITIS OF LEFT KNEE: Primary | ICD-10-CM

## 2024-03-27 DIAGNOSIS — Z86.718 HISTORY OF DVT (DEEP VEIN THROMBOSIS): ICD-10-CM

## 2024-03-27 DIAGNOSIS — K21.9 GASTROESOPHAGEAL REFLUX DISEASE WITH HIATAL HERNIA: Primary | ICD-10-CM

## 2024-03-27 DIAGNOSIS — Z86.010 HISTORY OF COLON POLYPS: ICD-10-CM

## 2024-03-27 DIAGNOSIS — Z86.711 HISTORY OF PULMONARY EMBOLUS (PE): ICD-10-CM

## 2024-03-27 DIAGNOSIS — Z79.01 LONG TERM (CURRENT) USE OF ANTICOAGULANTS: ICD-10-CM

## 2024-03-27 DIAGNOSIS — K44.9 GASTROESOPHAGEAL REFLUX DISEASE WITH HIATAL HERNIA: Primary | ICD-10-CM

## 2024-03-27 PROCEDURE — 97530 THERAPEUTIC ACTIVITIES: CPT | Performed by: PHYSICAL THERAPIST

## 2024-03-27 PROCEDURE — 97110 THERAPEUTIC EXERCISES: CPT | Performed by: PHYSICAL THERAPIST

## 2024-03-27 PROCEDURE — 99204 OFFICE O/P NEW MOD 45 MIN: CPT | Performed by: NURSE PRACTITIONER

## 2024-03-27 RX ORDER — OMEPRAZOLE 20 MG/1
20 CAPSULE, DELAYED RELEASE ORAL
Qty: 30 CAPSULE | Refills: 6 | Status: SHIPPED | OUTPATIENT
Start: 2024-03-27

## 2024-03-27 NOTE — TELEPHONE ENCOUNTER
Scheduled date of EGD(as of today):5-24-24  Physician performing EGD:URIEL  Location of EGD:[facility]  Instructions reviewed with patient by:DARYL  Clearances :  yes  on blood thinner ( Long name  )   Dabigatran  Etexialte  Mesylate

## 2024-03-27 NOTE — PROGRESS NOTES
[facility] - Outpatient Consultation  Linda Zimmerman 63 y.o. female MRN: [id number]  Encounter: [id number]    ASSESSMENT AND PLAN:      1. Gastroesophageal reflux disease with hiatal hernia  Patient states that she has had acid reflux symptoms for quite some time.  She also has a history of gastric ulcer many years ago.  States she did have an EGD a long time ago that also noted hiatal hernia at that time.  Patient states she does over-the-counter medications and Tums for relief.  Discussed with patient dietary discretion.  Trial omeprazole 20 mg daily prior to breakfast.  Discussed with patient that if symptoms continue and she has breakthrough acid reflux symptoms later in the day to communicate via MyChart and likely will increase medication to twice daily or 40 mg prior to breakfast.  Likely gastroesophageal reflux disease, consider dyspepsia, peptic versus gastric ulcer.    -Dietary discretion, monitor for food triggers  - Ambulatory Referral to Gastroenterology  - EGD scheduled at [facility] due to patient comorbidities of DVT/PE, as well as abnormal echo cardiogram.  -Omeprazole 20 mg p.o. daily prior to breakfast.    2. Long term (current) use of anticoagulants  Patient is currently taking Pradaxa daily for PE/DVT.  Patient denies chest pain, shortness of breath, palpitations, weakness or dizziness.  Discussed the small risk for thromboembolic event while holding the Pradaxa 2 days prior to procedure.  The patient understands the risks and benefits of holding the medication and agrees to proceed pending approval of the prescribing provider, Dr. Donovan.    3. History of DVT (deep vein thrombosis)  4. History of pulmonary embolus (PE)  Patient states that she had DVT after having her initial knee replaced.  States that after she had her second knee replaced in March 2024 she had bilateral PE and placed on Pradaxa daily.    5. History of colon  polyps  Colonoscopy 2020; 10-year recall.  Patient states she had colonoscopy done at [facility].      Followup Appointment: 3 weeks after procedures  ______________________________________________________________________    Chief Complaint   Patient presents with    Hiatal Hernia     Found on CT scan         HPI:   63-year-old female with past medical history of gastric ulcer, bilateral pulmonary PE, DVT, GERD, hiatal hernia, bilateral knee replacement presents for evaluation of gastroesophageal reflux disease.  On exam, patient denies nausea, vomiting or abdominal pain.  However patient does have increased epigastric pain with palpation.  Patient states she has periodic acid reflux symptoms requiring over-the-counter medications and Tums.  Patient states she had a EGD years ago and was told she also has a hiatal hernia.  Patient states her weight is stable.  States he is having daily normal bowel movements.  Denies hematochezia or melena.  Former smoker, occasional EtOH use.  Patient states  She did have EGDs a while ago for history of gastric ulcer however not on any current medications.      Historical Information   Past Medical History:   Diagnosis Date    Breast lump     Instability of knee joint     Lateral epicondylitis     Neoplasm of uncertain behavior of skin      Past Surgical History:   Procedure Laterality Date    BREAST EXCISIONAL BIOPSY Left 1993    benign    HYSTERECTOMY      age 54    KNEE SURGERY      LYMPH NODE BIOPSY      OOPHORECTOMY Bilateral     with hysterectomy, age 54     Social History     Substance and Sexual Activity   Alcohol Use Yes    Alcohol/week: 2.0 standard drinks of alcohol    Types: 2 Glasses of wine per week    Comment: Social      Social History     Substance and Sexual Activity   Drug Use Never     Social History     Tobacco Use   Smoking Status Former   Smokeless Tobacco Never     Family History   Problem Relation Age of Onset    Cancer Mother         t-cell  "lymphoma    Diabetes Father     No Known Problems Sister     No Known Problems Sister     No Known Problems Sister     No Known Problems Daughter     Cancer Maternal Grandmother     Ovarian cancer Maternal Grandmother         young, guessing 40's    Cancer Maternal Grandfather     Endometrial cancer Paternal Grandmother         age unknown    No Known Problems Paternal Grandfather     No Known Problems Paternal Aunt     No Known Problems Paternal Aunt        Meds/Allergies     Current Outpatient Medications:     albuterol (ProAir HFA) 90 mcg/act inhaler    Dabigatran Etexilate Mesylate 150 MG PACK    No Known Allergies    PHYSICAL EXAM:    Blood pressure 129/75, height 5' 9\" (1.753 m), weight 81.1 kg (178 lb 12.8 oz). Body mass index is 26.4 kg/m².    General Appearance: NAD, cooperative, alert  Eyes: Anicteric, PERRLA, EOMI  ENT:  Normocephalic, atraumatic, normal mucosa.    Neck:  Supple, symmetrical, trachea midline,   Resp:  Clear to auscultation bilaterally; no rales, rhonchi or wheezing; respirations unlabored   CV:  S1 S2, Regular rate and rhythm; no murmur, rub, or gallop.  GI:  Soft, epigastric tenderness with palpation, non-distended; normal bowel sounds; no masses, no organomegaly   Rectal: Deferred  Musculoskeletal: No cyanosis, clubbing or edema. Normal ROM.  Skin:  No jaundice, rashes, or lesions   Heme/Lymph: No palpable cervical lymphadenopathy  Psych: Normal affect, good eye contact  Neuro: No gross deficits, AAOx3    Lab Results:   Lab Results   Component Value Date    WBC 8.17 01/22/2024    HGB 11.8 01/22/2024    HCT 37.4 01/22/2024    MCV 92 01/22/2024     01/22/2024     Lab Results   Component Value Date     08/24/2017    K 3.5 01/22/2024     01/22/2024    CO2 26 01/22/2024    BUN 16 01/22/2024    CREATININE 0.86 01/22/2024    GLUCOSE 103 (H) 08/24/2017    CALCIUM 9.2 01/22/2024    AST 18 04/13/2023    ALT 15 04/13/2023    ALKPHOS 89 08/24/2017    PROT 6.9 08/24/2017    " "BILITOT 0.3 08/24/2017    EGFR 73 01/22/2024     No results found for: \"IRON\", \"TIBC\", \"FERRITIN\"  No results found for: \"LIPASE\"    Radiology Results:   DXA bone density spine hip and pelvis    Result Date: 3/7/2024  Narrative: DXA SCAN CLINICAL HISTORY: 63 years postmenopausal female. OTHER RISK FACTORS:  None. PHARMACOLOGIC THERAPY FOR OSTEOPOROSIS:  None. TECHNIQUE: Bone densitometry was performed using a   Hologic Horizon A bone densitometer.  Regions of interest appear properly placed. COMPARISON: There are no prior DXA studies performed on this unit for comparison. RESULTS: LUMBAR SPINE Level: L1, L3, L4  (L2 vertebra excluded from analysis due to local structural abnormalities or artifact): BMD: 0.816 gm/cm2 T-score: -2.2 LEFT  TOTAL HIP: BMD: 0.787 gm/cm2 T-score: -1.3 LEFT  FEMORAL NECK: BMD: 0.620 gm/cm2 T score: -2.1     Impression: 1. Low bone mass (osteopenia). 2.  The 10 year risk of hip fracture is 1.3% with the 10 year risk of major osteoporotic fracture being 9.9% as calculated by the [employer] fracture risk assessment tool (FRAX, which is based on data generated by the WHO Collaborating [place] for Metabolic Bone Diseases). 3.  The current NOF guidelines recommend treating patients with a T-score of -2.5 or less in the lumbar spine or hips, or in post-menopausal women and men over the age of 50 with low bone mass (osteopenia) and a FRAX 10 year risk score of >3% for hip fracture and/or >20% for major osteoporotic fracture. 4.  The NOF recommends follow-up DXA in 1-2 years after initiating therapy for osteoporosis and every 2 years thereafter. More frequent evaluation is appropriate for patients with conditions associated with rapid bone loss, such as glucocorticoid therapy. The interval between DXA screenings may be longer for individuals without major risk factors and initial T-score in the normal or upper low bone mass range. The FRAX algorithm has certain limitations: -FRAX has not " been validated in patients currently or previously treated with pharmacotherapy for osteoporosis.  In such patients, clinical judgment must be exercised in interpreting FRAX scores. -Prior hip, vertebral and humeral fragility fractures appear to confer greater risk of subsequent fracture than fractures at other sites (this is especially true for individuals with severe vertebral fractures), but quantification of this incremental risk is not possible with FRAX. -FRAX underestimates fracture risk in patients with history of multiple fragility fractures. -FRAX may underestimate fracture risk in patients with history of frequent falls. -It is not appropriate to use FRAX to monitor treatment response. WHO CLASSIFICATION: Normal (a T-score of -1.0 or higher) Low bone mineral density (a T-score of less than -1.0 but higher than -2.5) Osteoporosis (a T-score of -2.5 or less) Severe osteoporosis (a T-score of -2.5 or less with a fragility fracture) Workstation performed: [id number]     Mammo screening bilateral w 3d & cad    Result Date: 3/6/2024  Narrative: DIAGNOSIS: Encounter for screening mammogram for malignant neoplasm of breast TECHNIQUE: Digital screening mammography was performed. Computer Aided Detection (CAD) analyzed all applicable images. COMPARISONS: Prior breast imaging dated: 10/31/2022, 07/26/2021, 05/21/2020, 04/18/2019, 04/16/2018, 04/13/2017, 04/07/2016, and 03/13/2015 RELEVANT HISTORY: Family Breast Cancer History: No known family history of breast cancer. Family Medical History: Family medical history includes endometrial cancer in paternal grandmother and ovarian cancer in maternal grandmother. Personal History: Hormone history includes birth control. Surgical history includes breast biopsy, hysterectomy, and oophorectomy. No known relevant medical history. The patient is scheduled in a reminder system for screening mammography. 8-10% of cancers will be missed on mammography. Management of a palpable  abnormality must be based on clinical grounds.  Patients will be notified of their results via letter from our facility. Accredited by American College of Radiology and FDA. RISK ASSESSMENT: 5 Year Tyrer-Cuzick: 1.15% 10 Year Tyrer-Cuzick: 2.33% Lifetime Tyrer-Cuzick: 5.65% TISSUE DENSITY: There are scattered areas of fibroglandular density. INDICATION: Linda Zimmerman is a 63 y.o. female presenting for screening mammography. FINDINGS: There are no suspicious masses, grouped microcalcifications or areas of architectural distortion. The skin and nipple areolar complex are unremarkable.     Impression: No mammographic evidence of malignancy. No significant change ASSESSMENT/BI-RADS CATEGORY: Left: 1 - Negative Right: 1 - Negative Overall: 1 - Negative RECOMMENDATION:      - Routine screening mammogram in 1 year for both breasts. Workstation ID: [id number]         REVIEW OF SYSTEMS:    CONSTITUTIONAL: Denies any fever, chills, rigors, and weight loss.  HEENT: No earache or tinnitus. Denies hearing loss or visual disturbances.  CARDIOVASCULAR: No chest pain or palpitations.   RESPIRATORY: Denies any cough, hemoptysis, shortness of breath or dyspnea on exertion.  GASTROINTESTINAL: As noted in the History of Present Illness.   GENITOURINARY: No problems with urination. Denies any hematuria or dysuria.  NEUROLOGIC: No dizziness or vertigo, denies headaches.   MUSCULOSKELETAL: Denies any muscle or joint pain.   SKIN: Denies skin rashes or itching.   ENDOCRINE: Denies excessive thirst. Denies intolerance to heat or cold.  PSYCHOSOCIAL: Denies depression or anxiety. Denies any recent memory loss.

## 2024-03-27 NOTE — PROGRESS NOTES
"Daily Note     Today's date: 3/27/2024  Patient name: Linda Zimmerman  : 1960  MRN: [id number]  Referring provider: Lavon Martin PA-C  Dx:   Encounter Diagnosis     ICD-10-CM    1. Primary osteoarthritis of left knee  M17.12              Subjective: Pt reported that she is feeling very encouraged with her progress especially with her ability to go up/down stairs.    Objective: See treatment diary below    Assessment: Tolerated treatment well. Patient demonstrated fatigue post treatment, exhibited good technique with therapeutic exercises, and would benefit from continued PT    Plan: Continue per plan of care.      Diagnosis:  TKA  Left   Precautions:  Surgery date  24  PE in both lungs 2024  Hx of DVT   Comparable signs 1)   2)  3)    Primary Impairments: 1)   2)   Patient Goals STS, stairs, squat, gait, in/out bed/car, disturbed sleep, gardening       Precautions:   Past Medical History:   Diagnosis Date    Breast lump     Instability of knee joint     Lateral epicondylitis     Neoplasm of uncertain behavior of skin      Manuals 2/21 2/29 3/5 3/13 3/20 3/27   L knee PROM stretch  ksg LQ JZ JZ                               Neuro Re-Ed 2/21 2/29 3/5 3/13 3/20 3/27                                                                  Ther Ex 2/21 2/29 3/5 3/13 3/20 3/27   Seated knee ext stretch :30x  10\" x10  :30x3 :30x3  :30x3    Stand knee flexion stretch step :30x  10\" x10  14\" box :30x3 14\" box :30x3              LAQ  10x  2x10 0# 2x10, 3# 2x10 ea Isometric 3# :30x3  Isometric 3# :30  7# :30x2 Isometric 15# (proximal) :30x3    Stand knee flexion     3# 3x10     Calf stretch on / foam roll standing  10\" x10       Calf stretch step      :30x3 ea                                       Educated on HEP    5'   5'             Ther Activity 2/21 2/29  3/13 3/20 3/27   U/l LP     Seat4  30/ 3x10 ea Seat4   30/ 3x10  Seat4   30/ x10  40/ x10   50/ x10   U/l calf press      30/ 3x10    Step up   4 " "in/x10  6in/x10 6\" 2x10 ea 8\" 3x10 ea (too high - anterior knee pain) 6\" 3x10 ea   Forward step down    2\" 2UE assist  2x10 ea 4\" 2UE assist  2x10 ea 2\" 0UE assist 10x ea  4\" 1 finger assist 2x10 ea   Bike  5'  Rocking NV Full ROM 5'  5' lvl1 Lvl2 5'                                                              "

## 2024-04-02 ENCOUNTER — HOSPITAL ENCOUNTER (OUTPATIENT)
Dept: NON INVASIVE DIAGNOSTICS | Facility: HOSPITAL | Age: 64
Discharge: HOME | End: 2024-04-02
Payer: COMMERCIAL

## 2024-04-02 DIAGNOSIS — I26.99 BILATERAL PULMONARY EMBOLISM (HCC): ICD-10-CM

## 2024-04-02 PROCEDURE — 93970 EXTREMITY STUDY: CPT

## 2024-04-02 PROCEDURE — 93970 EXTREMITY STUDY: CPT | Performed by: SURGERY

## 2024-04-03 ENCOUNTER — OFFICE VISIT (OUTPATIENT)
Dept: PHYSICAL THERAPY | Facility: CLINIC | Age: 64
End: 2024-04-03
Payer: COMMERCIAL

## 2024-04-03 DIAGNOSIS — M17.12 PRIMARY OSTEOARTHRITIS OF LEFT KNEE: Primary | ICD-10-CM

## 2024-04-03 PROCEDURE — 97140 MANUAL THERAPY 1/> REGIONS: CPT

## 2024-04-03 PROCEDURE — 97110 THERAPEUTIC EXERCISES: CPT

## 2024-04-03 PROCEDURE — 97530 THERAPEUTIC ACTIVITIES: CPT

## 2024-04-03 NOTE — PROGRESS NOTES
"Daily Note     Today's date: 4/3/2024  Patient name: Linda Zimmerman  : 1960  MRN: [id number]  Referring provider: Lavon Martin PA-C  Dx:   Encounter Diagnosis     ICD-10-CM    1. Primary osteoarthritis of left knee  M17.12         Start Time: 915  Stop Time: 1000  Total time in clinic (min): 45 minutes    Subjective: Patient reports that she has radicular sxs down RLE and is not sure what might have caused the sxs. She states that sxs started 2 days ago after working outside lifting wood.    Objective: See treatment diary below. ZAYDA and laying performed. Nerve glides also performed. R radicular sxs abolished. Ice added at end of session.    Assessment: Tolerated treatment well. Session focused on control of sxs down RLE to better tolerate activities on LLE. Radicular sxs centralized but knee swelling palpated in R lateral knee. Ice applied, and strengthening continued to LLE. Patient advised to F/U with doctor if RLE swelling and pain continues. Patient demonstrated fatigue post treatment, exhibited good technique with therapeutic exercises, and would benefit from continued PT    Plan: Continue per plan of care.      Diagnosis:  TKA  Left   Precautions:  Surgery date  24  PE in both lungs 2024  Hx of DVT   Comparable signs 1)   2)  3)    Primary Impairments: 1)   2)   Patient Goals STS, stairs, squat, gait, in/out bed/car, disturbed sleep, gardening       Precautions:   Past Medical History:   Diagnosis Date    Breast lump     Instability of knee joint     Lateral epicondylitis     Neoplasm of uncertain behavior of skin      Manuals 4/3  3/5 3/13 3/20 3/27   L knee PROM stretch Piriformis STM,  R knee STM   LQ JZ JZ                               Neuro Re-Ed   3/5 3/13 3/20 3/27                                                                  Ther Ex   3/5 3/13 3/20 3/27   Seated knee ext stretch    :30x3 :30x3  :30x3    Stand knee flexion stretch step B/L 10x10\" ea   14\" box :30x3 14\" " "box :30x3              LAQ  Isometric 15# (proximal) :30x3   0# 2x10, 3# 2x10 ea Isometric 3# :30x3  Isometric 3# :30  7# :30x2 Isometric 15# (proximal) :30x3    Stand knee flexion     3# 3x10     Calf stretch on 1/2 foam roll standing         Calf stretch step      :30x3 ea   ZAYDA/ REIL 2x10        Sciatic N. Glide 2x10                          Educated on HEP    5'   5'             Ther Activity    3/13 3/20 3/27   U/l LP  Seat4   30/ x10  40/ x10   50/ x10   Seat4  30/ 3x10 ea Seat4   30/ 3x10  Seat4   30/ x10  40/ x10   50/ x10   U/l calf press      30/ 3x10    Step up HELD  4 in/x10  6in/x10 6\" 2x10 ea 8\" 3x10 ea (too high - anterior knee pain) 6\" 3x10 ea   Forward step down HELD   2\" 2UE assist  2x10 ea 4\" 2UE assist  2x10 ea 2\" 0UE assist 10x ea  4\" 1 finger assist 2x10 ea   Bike L0 5'  NV Full ROM 5'  5' lvl1 Lvl2 5'                                                              "

## 2024-04-10 ENCOUNTER — OFFICE VISIT (OUTPATIENT)
Dept: PHYSICAL THERAPY | Facility: CLINIC | Age: 64
End: 2024-04-10
Payer: COMMERCIAL

## 2024-04-10 DIAGNOSIS — M17.12 PRIMARY OSTEOARTHRITIS OF LEFT KNEE: Primary | ICD-10-CM

## 2024-04-10 PROCEDURE — 97140 MANUAL THERAPY 1/> REGIONS: CPT

## 2024-04-10 PROCEDURE — 97110 THERAPEUTIC EXERCISES: CPT

## 2024-04-10 PROCEDURE — 97530 THERAPEUTIC ACTIVITIES: CPT

## 2024-04-10 NOTE — PROGRESS NOTES
"Daily Note     Today's date: 4/10/2024  Patient name: Linda Zimmerman  : 1960  MRN: [id number]  Referring provider: Lavon Martin PA-C  Dx:   Encounter Diagnosis     ICD-10-CM    1. Primary osteoarthritis of left knee  M17.12           Start Time: 0915  Stop Time: 1000  Total time in clinic (min): 45 minutes    Subjective: Patient states that sxs down R leg has resolved. She has been ambulating step over step with ascent and descent of stair/She states that she did yoga this morning which felt good.    Objective: See treatment diary below.     Assessment: Tolerated treatment well. Continued with quad strengthening program. No pain noted t/o session. Patient demonstrated fatigue post treatment, exhibited good technique with therapeutic exercises, and would benefit from continued PT    Plan: Continue per plan of care.      Diagnosis:  TKA  Left   Precautions:  Surgery date  24  PE in both lungs 2024  Hx of DVT   Comparable signs 1)   2)  3)    Primary Impairments: 1)   2)   Patient Goals STS, stairs, squat, gait, in/out bed/car, disturbed sleep, gardening       Precautions:   Past Medical History:   Diagnosis Date    Breast lump     Instability of knee joint     Lateral epicondylitis     Neoplasm of uncertain behavior of skin      Manuals 4/3 4/10   3/20 3/27   L knee PROM stretch Piriformis STM,  R knee STM  L Knee ROM and STM   JZ                               Neuro Re-Ed     3/20 3/27                                                                  Ther Ex  4/10   3/20 3/27   Seated knee ext stretch     :30x3  :30x3    Stand knee flexion stretch step B/L 10x10\" ea 14\" box :30x3   14\" box :30x3              LAQ  Isometric 15# (proximal) :30x3  Isometric 15# (proximal) :30x3     +15# (prox) 1x10  15# (dis) 2x10   Isometric 3# :30  7# :30x2 Isometric 15# (proximal) :30x3    Stand knee flexion     3# 3x10     Calf stretch on 1/2 foam roll standing         Calf stretch step      :30x3 ea " "  ZAYDA/ REIL 2x10 PRN       Sciatic N. Glide 2x10 PRN                         Educated on HEP      5'             Ther Activity     3/20 3/27   U/l LP  Seat4   30/ x10  40/ x10   50/ x10 Seat4   30/ x10  40/ x10   50/ x10  60/x10   Seat4   30/ 3x10  Seat4   30/ x10  40/ x10   50/ x10   U/l calf press      30/ 3x10    Step up HELD 6\" 3x10 ea   8\" 3x10 ea (too high - anterior knee pain) 6\" 3x10 ea   Forward step down HELD    4\" 2UE assist  2x10 ea 2\" 0UE assist 10x ea  4\" 1 finger assist 2x10 ea   Bike L0 5' L1 5' L2 This visit   5' lvl1 Lvl2 5'                                                              "

## 2024-04-17 ENCOUNTER — APPOINTMENT (OUTPATIENT)
Dept: PHYSICAL THERAPY | Facility: CLINIC | Age: 64
End: 2024-04-17
Payer: COMMERCIAL

## 2024-04-19 NOTE — PROGRESS NOTES
Hematology/Oncology Outpatient Consult Note  Linda Zimmerman 63 y.o. female MRN: @ Encounter: [id number]        Date:  4/22/2024        CC: Bilateral pulmonary embolism      HPI:  Linda Zimmerman is a 63-year-old female who is referred to hematology for recommendations regarding history of multiple blood clots.  She is being seen for initial consultation 4/22/2024     Patient has history of provoked VTE's.  First clotting event occurred 10 years ago after undergoing left arthroscopic knee surgery.  She was anticoagulated with Coumadin for 3 months    After hysterectomy she received Lovenox prophylaxis. Did not develop VTE.     More recently, she was hospitalized at [facility] (2/29-3/1/24) with chest pain and shortness of breath.  She was diagnosed with acute bilateral pulmonary embolism.  Clotting event occurred 10 days after undergoing left total knee replacement on 2/19/24. She reports she was informed she had heart strain   She was discharged on Pradaxa and continues to take BID  She was on full dose aspirin after her TKR    Records from [facility] reviewed.  CT angiogram of chest on 3/1/2024 showed small volume bilateral PE.  Her echocardiogram demonstrated normal LVEF, normal right ventricular size and function.    No family history of thromboembolic disease    Previously saw hematology and was informed she is at high risk for clotting in the postoperative period.  Does not recall when she was seen by hematology and does not think she ever had hypercoagulable workup completed.  Does not smoke.  No history of malignancy.  She is up to date on all cancer screenings.     Patient reports chest pain and shortness of breath resolved since starting Pradaxa.  Denies any side effects of Pradaxa.  No easy bruising/bleeding.  Recent lower limb duplex study was negative for any acute or chronic DVT    Has hiatal hernia. Needs EGD, scheduled for 5/24/24    Test Results:    Imaging:  VAS VENOUS DUPLEX - LOWER  LIMB BILATERAL    Result Date: 4/2/2024  Narrative:  [facility] REPORT CLINICAL: Indications: Pulmonary Emboli [I26.99]. Patient presents with recent discovery of pulmonary embolism 1 month ago and physician wants to determine potential source.  Patient denies leg pain and swelling. S/P Left TKR 2/19/2024 and history of left leg DVT (2012). Currently on anticoagulant. Operative History: No known cardiovascular surgeries Risk Factors The patient has history of DVT.   CONCLUSION:  Impression: RIGHT LOWER LIMB: No evidence of acute or chronic deep vein thrombosis. No evidence of superficial thrombophlebitis noted. Doppler evaluation shows a normal response to augmentation maneuvers.. Popliteal, posterior tibial and anterior tibial arterial Doppler waveform's are triphasic.  LEFT LOWER LIMB: No evidence of acute or chronic deep vein thrombosis. No evidence of superficial thrombophlebitis noted. Doppler evaluation shows a normal response to augmentation maneuvers. Popliteal, posterior tibial and anterior tibial arterial Doppler waveform's are triphasic.  SIGNATURE: Electronically Signed by: SHERRILL SNIDER DO, RPVI on 2024-04-02 02:35:51 PM      Labs:   Lab Results   Component Value Date    WBC 8.17 01/22/2024    HGB 11.8 01/22/2024    HCT 37.4 01/22/2024    MCV 92 01/22/2024     01/22/2024     Lab Results   Component Value Date     08/24/2017    K 3.5 01/22/2024     01/22/2024    CO2 26 01/22/2024    BUN 16 01/22/2024    CREATININE 0.86 01/22/2024    GLUCOSE 103 (H) 08/24/2017    CALCIUM 9.2 01/22/2024    AST 18 04/13/2023    ALT 15 04/13/2023    ALKPHOS 89 08/24/2017    PROT 6.9 08/24/2017    BILITOT 0.3 08/24/2017    EGFR 73 01/22/2024             ROS:  Review of Systems   All other systems reviewed and are negative.          Active Problems:   Patient Active Problem List   Diagnosis    Cold thyroid nodule    Allergic rhinitis due to animal dander    Allergies    Gastroesophageal reflux  disease with hiatal hernia    Lyme disease    Osteoarthritis of knee    Raynaud's phenomenon    Hx of total hysterectomy    Abnormal pigmentation of skin    Pre-op examination    Encounter for immunization    UTI symptoms    Viral syndrome    Bilateral pulmonary embolism (HCC)    Status post total left knee replacement    History of DVT (deep vein thrombosis)       Past Medical History:   Past Medical History:   Diagnosis Date    Breast lump     Instability of knee joint     Lateral epicondylitis     Neoplasm of uncertain behavior of skin        Surgical History:   Past Surgical History:   Procedure Laterality Date    BREAST EXCISIONAL BIOPSY Left 1993    benign    HYSTERECTOMY      age 54    KNEE SURGERY      LYMPH NODE BIOPSY      OOPHORECTOMY Bilateral     with hysterectomy, age 54       Family History:    Family History   Problem Relation Age of Onset    Cancer Mother         t-cell lymphoma    Diabetes Father     No Known Problems Sister     No Known Problems Sister     No Known Problems Sister     No Known Problems Daughter     Cancer Maternal Grandmother     Ovarian cancer Maternal Grandmother         young, guessing 40's    Cancer Maternal Grandfather     Endometrial cancer Paternal Grandmother         age unknown    No Known Problems Paternal Grandfather     No Known Problems Paternal Aunt     No Known Problems Paternal Aunt        Cancer-related family history includes Cancer in her maternal grandfather, maternal grandmother, and mother; Endometrial cancer in her paternal grandmother; Ovarian cancer in her maternal grandmother.    Social History:   Social History     Socioeconomic History    Marital status: /Civil Union     Spouse name: Not on file    Number of children: Not on file    Years of education: Not on file    Highest education level: Not on file   Occupational History    Not on file   Tobacco Use    Smoking status: Former    Smokeless tobacco: Never   Vaping Use    Vaping status: Never  Used   Substance and Sexual Activity    Alcohol use: Yes     Alcohol/week: 2.0 standard drinks of alcohol     Types: 2 Glasses of wine per week     Comment: Social     Drug use: Never    Sexual activity: Yes     Partners: Male     Birth control/protection: Female Sterilization   Other Topics Concern    Not on file   Social History Narrative    Not on file     Social Determinants of Health     Financial Resource Strain: Not on file   Food Insecurity: Not on file   Transportation Needs: Not on file   Physical Activity: Not on file   Stress: Not on file   Social Connections: Not on file   Intimate Partner Violence: Not on file   Housing Stability: Not on file       Current Medications:   Current Outpatient Medications   Medication Sig Dispense Refill    albuterol (ProAir HFA) 90 mcg/act inhaler Inhale 2 puffs every 4 (four) hours as needed for wheezing 18 g 2    Dabigatran Etexilate Mesylate 150 MG PACK       omeprazole (PriLOSEC) 20 mg delayed release capsule Take 1 capsule (20 mg total) by mouth daily before breakfast 30 capsule 6     No current facility-administered medications for this visit.       Allergies: No Known Allergies      Physical Exam:    Body surface area is 1.99 meters squared.    Wt Readings from Last 3 Encounters:   04/22/24 83 kg (183 lb)   03/27/24 81.1 kg (178 lb 12.8 oz)   03/06/24 77.6 kg (171 lb)        Temp Readings from Last 3 Encounters:   03/06/24 98.1 °F (36.7 °C) (Tympanic)   02/15/24 98.1 °F (36.7 °C) (Tympanic)   02/02/24 97.5 °F (36.4 °C)        BP Readings from Last 3 Encounters:   04/22/24 132/78   03/27/24 129/75   03/06/24 122/84         Pulse Readings from Last 3 Encounters:   04/22/24 91   03/06/24 89   02/15/24 87          Physical Exam  Constitutional:       General: She is not in acute distress.     Appearance: Normal appearance.   HENT:      Head: Normocephalic and atraumatic.   Eyes:      General: No scleral icterus.        Right eye: No discharge.         Left eye: No  discharge.      Conjunctiva/sclera: Conjunctivae normal.   Cardiovascular:      Rate and Rhythm: Normal rate and regular rhythm.   Pulmonary:      Effort: Pulmonary effort is normal. No respiratory distress.      Breath sounds: Normal breath sounds.   Abdominal:      General: Bowel sounds are normal. There is no distension.      Palpations: Abdomen is soft. There is no mass.      Tenderness: There is no abdominal tenderness.   Musculoskeletal:         General: Normal range of motion.   Lymphadenopathy:      Cervical: No cervical adenopathy.      Upper Body:      Right upper body: No supraclavicular, axillary or pectoral adenopathy.      Left upper body: No supraclavicular, axillary or pectoral adenopathy.   Skin:     General: Skin is warm and dry.   Neurological:      General: No focal deficit present.      Mental Status: She is alert and oriented to person, place, and time.   Psychiatric:         Mood and Affect: Mood normal.         Behavior: Behavior normal.           Assessment/ Plan:    1. Bilateral pulmonary embolism (HCC)    2. History of DVT (deep vein thrombosis)    3. Status post total left knee replacement      Patient is a very pleasant 63-year-old female with a history of 2 provoked VTE.  First blood clot occurred 10 years ago after undergoing left knee arthroscopy.  She was found to have left lower extremity DVT.  She was anticoagulated for 3 months  Patient was recently found to have bilateral PE after undergoing total left knee replacement.  Again another provoked clotting event.  She has never had an unprovoked blood clot  No family history of venous thromboembolism    Presently on Pradaxa and tolerating well.  I did review imaging studies that were done at [place].  CTA of chest does show bilateral PE with equivocal findings for heart strain.  Echocardiogram was normal without any concerning findings.  I would treat this as a provoked PE with 6 months of anticoagulation.    I have ordered a  "limited hypercoagulable workup to rule out any genetic predisposition to clotting.  If this is negative, would discontinue Pradaxa after 6 months of therapy.  However, patient will require prophylactic anticoagulation with blood thinner for at least 6 weeks after any future invasive procedures to prevent VTE.    She is scheduled for EGD next month.  She may hold Pradaxa for 3 days prior to this procedure and resume once completed    I will see her back with results of workup ordered today to review and discuss findings.  She will continue on Pradaxa.  She is in agreement with this plan of care and knows to call with any questions or concerns prior to her next visit      Portions of the record may have been created with voice recognition software.  Occasional wrong word or \"sound a like\" substitutions may have occurred due to the inherent limitations of voice recognition software.  Read the chart carefully and recognize, using context, where substitutions have occurred.          "

## 2024-04-22 ENCOUNTER — CONSULT (OUTPATIENT)
Age: 64
End: 2024-04-22
Payer: COMMERCIAL

## 2024-04-22 ENCOUNTER — TELEPHONE (OUTPATIENT)
Age: 64
End: 2024-04-22

## 2024-04-22 VITALS
SYSTOLIC BLOOD PRESSURE: 132 MMHG | WEIGHT: 183 LBS | DIASTOLIC BLOOD PRESSURE: 78 MMHG | HEIGHT: 69 IN | OXYGEN SATURATION: 100 % | BODY MASS INDEX: 27.11 KG/M2 | HEART RATE: 91 BPM | RESPIRATION RATE: 18 BRPM

## 2024-04-22 DIAGNOSIS — Z96.652 STATUS POST TOTAL LEFT KNEE REPLACEMENT: ICD-10-CM

## 2024-04-22 DIAGNOSIS — I26.99 BILATERAL PULMONARY EMBOLISM (HCC): ICD-10-CM

## 2024-04-22 DIAGNOSIS — Z86.718 HISTORY OF DVT (DEEP VEIN THROMBOSIS): ICD-10-CM

## 2024-04-22 PROCEDURE — 99204 OFFICE O/P NEW MOD 45 MIN: CPT | Performed by: NURSE PRACTITIONER

## 2024-04-23 ENCOUNTER — APPOINTMENT (OUTPATIENT)
Dept: LAB | Facility: HOSPITAL | Age: 64
End: 2024-04-23
Payer: COMMERCIAL

## 2024-04-23 DIAGNOSIS — I26.99 BILATERAL PULMONARY EMBOLISM (HCC): ICD-10-CM

## 2024-04-23 DIAGNOSIS — Z86.718 HISTORY OF DVT (DEEP VEIN THROMBOSIS): ICD-10-CM

## 2024-04-23 PROCEDURE — 36415 COLL VENOUS BLD VENIPUNCTURE: CPT

## 2024-04-23 PROCEDURE — 81240 F2 GENE: CPT

## 2024-04-23 PROCEDURE — 81241 F5 GENE: CPT

## 2024-04-24 ENCOUNTER — APPOINTMENT (OUTPATIENT)
Dept: PHYSICAL THERAPY | Facility: CLINIC | Age: 64
End: 2024-04-24
Payer: COMMERCIAL

## 2024-04-25 DIAGNOSIS — K44.9 GASTROESOPHAGEAL REFLUX DISEASE WITH HIATAL HERNIA: ICD-10-CM

## 2024-04-25 DIAGNOSIS — K21.9 GASTROESOPHAGEAL REFLUX DISEASE WITH HIATAL HERNIA: ICD-10-CM

## 2024-04-25 LAB
B2 GLYCOPROT1 IGA SER-ACNC: <9 GPI IGA UNITS (ref 0–25)
B2 GLYCOPROT1 IGG SER-ACNC: <9 GPI IGG UNITS (ref 0–20)
B2 GLYCOPROT1 IGM SER-ACNC: <9 GPI IGM UNITS (ref 0–32)
CARDIOLIPIN IGA SER IA-ACNC: <9 APL U/ML (ref 0–11)
CARDIOLIPIN IGG SER IA-ACNC: <9 GPL U/ML (ref 0–14)
CARDIOLIPIN IGM SER IA-ACNC: <9 MPL U/ML (ref 0–12)

## 2024-04-25 RX ORDER — OMEPRAZOLE 20 MG/1
20 CAPSULE, DELAYED RELEASE ORAL
Qty: 30 CAPSULE | Refills: 5 | Status: SHIPPED | OUTPATIENT
Start: 2024-04-25

## 2024-04-26 ENCOUNTER — TELEPHONE (OUTPATIENT)
Age: 64
End: 2024-04-26

## 2024-04-26 NOTE — TELEPHONE ENCOUNTER
Dariana from [facility] called to request a backdate on a referral that is already in the system. She requested March 1st for the date as this is the date [facility] saw the pt in the hospital.    Patient is requesting an insurance referral for the following specialty:      Test Name / Order Name: [facility]    DX Code: I26.99    Date Of Service: March 1, 2024    Location/Facility Name/Address/Phone #: [address] [facility] [address]      Location / Facility NPI: [id number]    [facility] Phone # To Reach The Patient: [phone]

## 2024-05-01 NOTE — TELEPHONE ENCOUNTER
Confirmation - Referral [id number]  Effective: 03/01/2024     Expires: 05/30/2024  Active  Referred From  [facility]  Specialty: Continuing Care long term Center  Group NPI: [id number]  Provider ID: [id number]  Tax ID: [id number]  [address] [place] [address]  Referred To  [facility]  Specialty: Pulmonary Disease  Tier 1  Group NPI: [id number]  Provider ID: [id number]  Tax ID: [id number]  This referral is valid at any location for the above group  Patient Info  VIRIDIANA HARRIS  [id number]  Female  1960  [address] [place] [address]  Clinical Information  Place of Service  Office  Service Type  Medical Care  Diagnoses  1 R68.89 - other general symptoms and signs  2 I26.99 - other pulmonary embolism without acute cor pulmonale  Procedures  1 21390 - unlisted evaluation and management service

## 2024-05-02 DIAGNOSIS — I26.99 BILATERAL PULMONARY EMBOLISM (HCC): Primary | ICD-10-CM

## 2024-05-02 RX ORDER — DABIGATRAN ETEXILATE 150 MG/1
150 CAPSULE ORAL 2 TIMES DAILY
Qty: 60 CAPSULE | Refills: 5 | Status: SHIPPED | OUTPATIENT
Start: 2024-05-02

## 2024-05-06 LAB
F2 GENE MUT ANL BLD/T: NORMAL
Lab: NORMAL

## 2024-05-10 LAB
F5 GENE MUT ANL BLD/T: NORMAL
Lab: NORMAL

## 2024-05-13 ENCOUNTER — TELEPHONE (OUTPATIENT)
Dept: FAMILY MEDICINE CLINIC | Facility: CLINIC | Age: 64
End: 2024-05-13

## 2024-05-13 NOTE — TELEPHONE ENCOUNTER
Patient called requesting referral for [facility], Dr. Torres.    Requested as follows:    Confirmation - Referral [id number]  Effective: 05/13/2024     Expires: 08/11/2024  Active  Referred From  [facility]  Specialty: Continuing Care [facility]  Group NPI: [id number]  Provider ID: [id number]  Tax ID: [id number]  [address] [place] [address]  Referred To  [facility]  Specialty: Pulmonary Disease  Tier 1  Group NPI: [id number]  Provider ID: [id number]  Tax ID: [id number]  This referral is valid at any location for the above group  Patient Info  VIRIDIANA HARRIS  [id number]  Female  1960  [address] [place] [id number]  Clinical Information  Place of Service  Office  Service Type  Medical Care  Diagnosis  1R06.02 - shortness of breath  Procedures  199499 - unlisted evaluation and management service

## 2024-05-23 ENCOUNTER — TELEPHONE (OUTPATIENT)
Dept: GASTROENTEROLOGY | Facility: HOSPITAL | Age: 64
End: 2024-05-23

## 2024-05-24 ENCOUNTER — ANESTHESIA (OUTPATIENT)
Dept: GASTROENTEROLOGY | Facility: HOSPITAL | Age: 64
End: 2024-05-24

## 2024-05-24 ENCOUNTER — HOSPITAL ENCOUNTER (OUTPATIENT)
Dept: GASTROENTEROLOGY | Facility: HOSPITAL | Age: 64
Setting detail: OUTPATIENT SURGERY
End: 2024-05-24
Payer: COMMERCIAL

## 2024-05-24 ENCOUNTER — ANESTHESIA EVENT (OUTPATIENT)
Dept: GASTROENTEROLOGY | Facility: HOSPITAL | Age: 64
End: 2024-05-24

## 2024-05-24 VITALS
DIASTOLIC BLOOD PRESSURE: 84 MMHG | OXYGEN SATURATION: 98 % | SYSTOLIC BLOOD PRESSURE: 143 MMHG | TEMPERATURE: 97.5 F | RESPIRATION RATE: 16 BRPM | HEART RATE: 96 BPM

## 2024-05-24 DIAGNOSIS — K44.9 GASTROESOPHAGEAL REFLUX DISEASE WITH HIATAL HERNIA: ICD-10-CM

## 2024-05-24 DIAGNOSIS — K21.9 GASTROESOPHAGEAL REFLUX DISEASE WITH HIATAL HERNIA: ICD-10-CM

## 2024-05-24 PROCEDURE — 43239 EGD BIOPSY SINGLE/MULTIPLE: CPT | Performed by: INTERNAL MEDICINE

## 2024-05-24 PROCEDURE — 88305 TISSUE EXAM BY PATHOLOGIST: CPT | Performed by: STUDENT IN AN ORGANIZED HEALTH CARE EDUCATION/TRAINING PROGRAM

## 2024-05-24 PROCEDURE — 88313 SPECIAL STAINS GROUP 2: CPT | Performed by: STUDENT IN AN ORGANIZED HEALTH CARE EDUCATION/TRAINING PROGRAM

## 2024-05-24 RX ORDER — PROPOFOL 10 MG/ML
INJECTION, EMULSION INTRAVENOUS AS NEEDED
Status: DISCONTINUED | OUTPATIENT
Start: 2024-05-24 | End: 2024-05-24

## 2024-05-24 RX ORDER — SODIUM CHLORIDE 9 MG/ML
INJECTION, SOLUTION INTRAVENOUS CONTINUOUS PRN
Status: DISCONTINUED | OUTPATIENT
Start: 2024-05-24 | End: 2024-05-24

## 2024-05-24 RX ORDER — LIDOCAINE HYDROCHLORIDE 20 MG/ML
INJECTION, SOLUTION EPIDURAL; INFILTRATION; INTRACAUDAL; PERINEURAL AS NEEDED
Status: DISCONTINUED | OUTPATIENT
Start: 2024-05-24 | End: 2024-05-24

## 2024-05-24 RX ADMIN — SODIUM CHLORIDE: 9 INJECTION, SOLUTION INTRAVENOUS at 14:15

## 2024-05-24 RX ADMIN — LIDOCAINE HYDROCHLORIDE 100 MG: 20 INJECTION, SOLUTION EPIDURAL; INFILTRATION; INTRACAUDAL; PERINEURAL at 14:23

## 2024-05-24 RX ADMIN — PROPOFOL 150 MG: 10 INJECTION, EMULSION INTRAVENOUS at 14:23

## 2024-05-24 NOTE — H&P
History and Physical - [facility]  Linda Zimmerman 63 y.o. female MRN: [id number]    HPI: Linda Zimmerman is a 63 y.o. female who presents for EGD due to breakthrough GERD and known hiatal hernia    REVIEW OF SYSTEMS: Per the HPI, and otherwise unremarkable.    Historical Information   Past Medical History:   Diagnosis Date    Breast lump     Instability of knee joint     Lateral epicondylitis     Neoplasm of uncertain behavior of skin      Past Surgical History:   Procedure Laterality Date    BREAST EXCISIONAL BIOPSY Left 1993    benign    HYSTERECTOMY      age 54    KNEE SURGERY      LYMPH NODE BIOPSY      OOPHORECTOMY Bilateral     with hysterectomy, age 54     Social History   Social History     Substance and Sexual Activity   Alcohol Use Yes    Alcohol/week: 2.0 standard drinks of alcohol    Types: 2 Glasses of wine per week    Comment: Social      Social History     Substance and Sexual Activity   Drug Use Never     Social History     Tobacco Use   Smoking Status Former   Smokeless Tobacco Never     Family History   Problem Relation Age of Onset    Cancer Mother         t-cell lymphoma    Diabetes Father     No Known Problems Sister     No Known Problems Sister     No Known Problems Sister     No Known Problems Daughter     Cancer Maternal Grandmother     Ovarian cancer Maternal Grandmother         young, guessing 40's    Cancer Maternal Grandfather     Endometrial cancer Paternal Grandmother         age unknown    No Known Problems Paternal Grandfather     No Known Problems Paternal Aunt     No Known Problems Paternal Aunt        Meds/Allergies       Current Outpatient Medications:     albuterol (ProAir HFA) 90 mcg/act inhaler    dabigatran etexilate (PRADAXA) 150 mg capsu    omeprazole (PriLOSEC) 20 mg delayed release capsule    No Known Allergies    Objective     /75   Pulse 95   Temp 97.5 °F (36.4 °C) (Temporal)   Resp 18   SpO2 95%     PHYSICAL EXAM    General Appearance:  NAD, cooperative, alert  Eyes: Anicteric  GI:  Soft, non-tender, non-distended; normal bowel sounds; no masses, no organomegaly   Rectal: Deferred until procedure  Musculoskeletal: No edema.  Skin:  No jaundice    ASSESSMENT/PLAN:  This is a 63 y.o. year old female here for upper endoscopy, and she is stable and optimized for her procedure.

## 2024-05-24 NOTE — ANESTHESIA PREPROCEDURE EVALUATION
Procedure:  EGD    Relevant Problems   CARDIO   (+) Bilateral pulmonary embolism (HCC)      GI/HEPATIC   (+) Gastroesophageal reflux disease with hiatal hernia      MUSCULOSKELETAL   (+) Gastroesophageal reflux disease with hiatal hernia   (+) Osteoarthritis of knee      Other   (+) History of DVT (deep vein thrombosis)        Physical Exam    Airway    Mallampati score: II  TM Distance: >3 FB  Neck ROM: full     Dental   No notable dental hx     Cardiovascular      Pulmonary      Other Findings  post-pubertal.      Anesthesia Plan  ASA Score- 2     Anesthesia Type- IV sedation with anesthesia with ASA Monitors.         Additional Monitors:     Airway Plan:            Plan Factors-    Chart reviewed.    Patient summary reviewed.    Patient is not a current smoker.              Induction- intravenous.    Postoperative Plan-         Informed Consent- Anesthetic plan and risks discussed with patient.  I personally reviewed this patient with the CRNA. Discussed and agreed on the Anesthesia Plan with the CRNA..

## 2024-05-24 NOTE — ANESTHESIA POSTPROCEDURE EVALUATION
Post-Op Assessment Note    CV Status:  Stable    Pain management: adequate       Mental Status:  Alert and awake   Hydration Status:  Euvolemic   PONV Controlled:  Controlled   Airway Patency:  Patent     Post Op Vitals Reviewed: Yes    No anethesia notable event occurred.    Staff: CRNA               /75 (05/24/24 1432)    Temp      Pulse 96 (05/24/24 1432)   Resp 16 (05/24/24 1432)    SpO2 96 % (05/24/24 1432)

## 2024-05-29 PROCEDURE — 88305 TISSUE EXAM BY PATHOLOGIST: CPT | Performed by: STUDENT IN AN ORGANIZED HEALTH CARE EDUCATION/TRAINING PROGRAM

## 2024-05-29 PROCEDURE — 88313 SPECIAL STAINS GROUP 2: CPT | Performed by: STUDENT IN AN ORGANIZED HEALTH CARE EDUCATION/TRAINING PROGRAM

## 2024-06-03 ENCOUNTER — HOSPITAL ENCOUNTER (OUTPATIENT)
Dept: CT IMAGING | Facility: HOSPITAL | Age: 64
Discharge: HOME | End: 2024-06-03
Payer: COMMERCIAL

## 2024-06-03 DIAGNOSIS — Z87.09 PERSONAL HISTORY OF UNSPECIFIED DISEASE OF RESPIRATORY SYSTEM: ICD-10-CM

## 2024-06-03 PROCEDURE — 71250 CT THORAX DX C-: CPT

## 2024-06-04 NOTE — PROGRESS NOTES
HEMATOLOGY / ONCOLOGY CLINIC FOLLOW UP NOTE    Primary Care Provider: Patricia Stone DO  Referring Provider: Krystle Barnett  MRN: [id number]  : 1960    Reason for Encounter: Follow-up for pulmonary embolism       Interval History: Returns for follow-up to review limited hypercoagulable workup that was ordered for her history of DVT, bilateral PE.  Workup is negative for factor V Leiden, prothrombin gene mutation.  Cardiolipin antibodies and beta-2 glycoprotein antibodies are normal  She is currently on Pradaxa and tolerating without any side effects.  She states she feels well.  Denies any chest pain, shortness of breath.  CT of chest done prior to today's visit is still pending read      REVIEW OF SYSTEMS:  Please note that a 14-point review of systems was performed to include Constitutional, HEENT, Respiratory, CVS, GI, , Musculoskeletal, Integumentary, Neurologic, Rheumatologic, Endocrinologic, Psychiatric, Lymphatic, and Hematologic/Oncologic systems were reviewed and are negative unless otherwise stated in HPI. Positive and negative findings pertinent to this evaluation are incorporated into the history of present illness.      ECOG PS: 0    PROBLEM LIST:  Patient Active Problem List   Diagnosis    Cold thyroid nodule    Allergic rhinitis due to animal dander    Allergies    Gastroesophageal reflux disease with hiatal hernia    Lyme disease    Osteoarthritis of knee    Raynaud's phenomenon    Hx of total hysterectomy    Abnormal pigmentation of skin    Pre-op examination    Encounter for immunization    UTI symptoms    Viral syndrome    Bilateral pulmonary embolism (HCC)    Status post total left knee replacement    History of DVT (deep vein thrombosis)       Assessment / Plan:  1. History of DVT (deep vein thrombosis)    2. Bilateral pulmonary embolism (HCC)      Patient is a very pleasant 64 year-old female with a history of 2 provoked VTE.  First blood clot occurred 10 years ago after  undergoing left knee arthroscopy.  She was found to have left lower extremity DVT. She was anticoagulated for 3 months  Patient was recently found to have bilateral PE after undergoing total left knee replacement.  Again another provoked clotting event.  She has never had an unprovoked blood clot  No family history of venous thromboembolism    Limited hypercoagulable workup is negative for any underlying genetic predisposition for clotting.  She is on Pradaxa and has completed approximately 3 months of anticoagulation.  I have recommended she complete a total of 6 months of anticoagulation with Pradaxa.  She ideally would like to stop at this time, but will continue to complete 6 months as recommended  CT of chest done earlier this week is still pending read, we will notify her of findings    We did review given her history, she will require prophylactic anticoagulation with blood thinner for at least 6 weeks after any future invasive procedures.  She will follow with hematology on an as-needed basis going forward.  We will happily see her back in the future should the need arise.  Patient is in agreement with this plan of care and knows to call with any questions or concerns at any time    I spent 30 minutes on chart review, face to face counseling time, coordination of care and documentation.    Past Medical History:   has a past medical history of Breast lump, Instability of knee joint, Lateral epicondylitis, and Neoplasm of uncertain behavior of skin.    PAST SURGICAL HISTORY:   has a past surgical history that includes Hysterectomy; Oophorectomy (Bilateral); Knee surgery; Breast excisional biopsy (Left, 1993); and Lymph node biopsy.    CURRENT MEDICATIONS  Current Outpatient Medications   Medication Sig Dispense Refill    albuterol (ProAir HFA) 90 mcg/act inhaler Inhale 2 puffs every 4 (four) hours as needed for wheezing 18 g 2    dabigatran etexilate (PRADAXA) 150 mg capsu Take 1 capsule (150 mg total) by  "mouth 2 (two) times a day 60 capsule 5    omeprazole (PriLOSEC) 20 mg delayed release capsule Take 1 capsule (20 mg total) by mouth daily before breakfast 30 capsule 5     No current facility-administered medications for this visit.     [unfilled]    SOCIAL HISTORY:   reports that she has quit smoking. She has never used smokeless tobacco. She reports current alcohol use of about 2.0 standard drinks of alcohol per week. She reports that she does not use drugs.     FAMILY HISTORY:  family history includes Cancer in her maternal grandfather, maternal grandmother, and mother; Diabetes in her father; Endometrial cancer in her paternal grandmother; No Known Problems in her daughter, paternal aunt, paternal aunt, paternal grandfather, sister, sister, and sister; Ovarian cancer in her maternal grandmother.     ALLERGIES:  has No Known Allergies.      Physical Exam:  Vital Signs:   Visit Vitals  /73 (BP Location: Left arm, Patient Position: Sitting, Cuff Size: Standard)   Pulse 93   Temp 98 °F (36.7 °C) (Temporal)   Resp 16   Ht 5' 9\" (1.753 m)   Wt 81.7 kg (180 lb 3.2 oz)   SpO2 100%   BMI 26.61 kg/m²   OB Status Hysterectomy   Smoking Status Former   BSA 1.98 m²     Body mass index is 26.61 kg/m².  Body surface area is 1.98 meters squared.    Physical Exam  Constitutional:       General: She is not in acute distress.     Appearance: Normal appearance.   HENT:      Head: Normocephalic and atraumatic.   Eyes:      General: No scleral icterus.        Right eye: No discharge.         Left eye: No discharge.      Conjunctiva/sclera: Conjunctivae normal.   Cardiovascular:      Rate and Rhythm: Normal rate and regular rhythm.   Pulmonary:      Effort: Pulmonary effort is normal. No respiratory distress.      Breath sounds: Normal breath sounds.   Abdominal:      General: Bowel sounds are normal. There is no distension.      Palpations: Abdomen is soft. There is no mass.      Tenderness: There is no abdominal tenderness. "   Musculoskeletal:         General: Normal range of motion.   Lymphadenopathy:      Cervical: No cervical adenopathy.      Upper Body:      Right upper body: No supraclavicular, axillary or pectoral adenopathy.      Left upper body: No supraclavicular, axillary or pectoral adenopathy.   Skin:     General: Skin is warm and dry.   Neurological:      General: No focal deficit present.      Mental Status: She is alert and oriented to person, place, and time.   Psychiatric:         Mood and Affect: Mood normal.         Behavior: Behavior normal.         Labs:  Lab Results   Component Value Date    WBC 8.17 01/22/2024    HGB 11.8 01/22/2024    HCT 37.4 01/22/2024    MCV 92 01/22/2024     01/22/2024     Lab Results   Component Value Date     08/24/2017    SODIUM 138 01/22/2024    K 3.5 01/22/2024     01/22/2024    CO2 26 01/22/2024    AGAP 9 01/22/2024    BUN 16 01/22/2024    CREATININE 0.86 01/22/2024    GLUC 83 01/22/2024    CALCIUM 9.2 01/22/2024    AST 18 04/13/2023    ALT 15 04/13/2023    ALKPHOS 89 08/24/2017    PROT 6.9 08/24/2017    TP 6.9 04/13/2023    BILITOT 0.3 08/24/2017    TBILI 0.4 04/13/2023    EGFR 73 01/22/2024

## 2024-06-07 ENCOUNTER — OFFICE VISIT (OUTPATIENT)
Age: 64
End: 2024-06-07
Payer: COMMERCIAL

## 2024-06-07 VITALS
DIASTOLIC BLOOD PRESSURE: 73 MMHG | BODY MASS INDEX: 26.69 KG/M2 | RESPIRATION RATE: 16 BRPM | HEART RATE: 93 BPM | TEMPERATURE: 98 F | SYSTOLIC BLOOD PRESSURE: 147 MMHG | HEIGHT: 69 IN | OXYGEN SATURATION: 100 % | WEIGHT: 180.2 LBS

## 2024-06-07 DIAGNOSIS — I26.99 BILATERAL PULMONARY EMBOLISM (HCC): ICD-10-CM

## 2024-06-07 DIAGNOSIS — Z86.718 HISTORY OF DVT (DEEP VEIN THROMBOSIS): Primary | ICD-10-CM

## 2024-06-07 PROCEDURE — 99214 OFFICE O/P EST MOD 30 MIN: CPT | Performed by: NURSE PRACTITIONER

## 2024-06-12 ENCOUNTER — OFFICE VISIT (OUTPATIENT)
Dept: GASTROENTEROLOGY | Facility: CLINIC | Age: 64
End: 2024-06-12
Payer: COMMERCIAL

## 2024-06-12 ENCOUNTER — TELEPHONE (OUTPATIENT)
Dept: FAMILY MEDICINE CLINIC | Facility: CLINIC | Age: 64
End: 2024-06-12

## 2024-06-12 VITALS — HEIGHT: 69 IN | WEIGHT: 179.2 LBS | BODY MASS INDEX: 26.54 KG/M2

## 2024-06-12 DIAGNOSIS — Z86.010 HISTORY OF COLON POLYPS: ICD-10-CM

## 2024-06-12 DIAGNOSIS — Z86.718 HISTORY OF DVT (DEEP VEIN THROMBOSIS): ICD-10-CM

## 2024-06-12 DIAGNOSIS — K21.00 GASTROESOPHAGEAL REFLUX DISEASE WITH ESOPHAGITIS WITHOUT HEMORRHAGE: Primary | ICD-10-CM

## 2024-06-12 DIAGNOSIS — Z86.711 HX OF PULMONARY EMBOLUS: ICD-10-CM

## 2024-06-12 DIAGNOSIS — Z87.11 HX OF GASTRIC ULCER: ICD-10-CM

## 2024-06-12 PROCEDURE — 99213 OFFICE O/P EST LOW 20 MIN: CPT | Performed by: NURSE PRACTITIONER

## 2024-06-12 NOTE — PROGRESS NOTES
[facility] - Outpatient Follow-up Note  Linda Zimmerman 64 y.o. female MRN: [id number]  Encounter: [id number]    ASSESSMENT AND PLAN:      1. Gastroesophageal reflux disease with esophagitis without hemorrhage  Patient presents for evaluation after having EGD for increased acid reflux symptoms.  Patient states since having EGD and continuing on omeprazole 20 mg daily she is not having acid reflux symptoms.  Patient was concerned with bone density and osteoporosis.  Discussed with patient that we can have discussion if there is concern for her continuing on PPI.  Patient does wish to continue on omeprazole 20 mg daily at this time.    2. Hx of gastric ulcer  Discussed with patient to try and avoid NSAIDs if possible.  Any sign of anemia or melena, patient needs to be seen.    3. History of DVT (deep vein thrombosis)  4. Hx of pulmonary embolus  Patient currently taking Pradaxa 150 mg twice daily for history of DVT and PE.  Patient states she is recently had a CT and will be discussing trying to discontinue the Pradaxa.    5. History of colon polyps  Colonoscopy 6/2020; 10-year recall.  Colonoscopy completed at [facility] one 2 mm polyp noted.  Patient denies family history of colon cancer.      Followup Appointment: 6 months  ______________________________________________________________________      HPI:    64-year-old female with past medical history of PE, DVT, gastric ulcer and GERD presents for follow-up EGD 5/24/2024.  EGD resulted with 2 cm hiatal hernia, question Koroma's however biopsies negative for Koroma's esophagus.  Patient did have acute esophagitis and recommended continuing on omeprazole 20 mg daily.  On exam, patient denies nausea, vomiting or abdominal pain.  Patient states that since taking the omeprazole 20 mg daily her acid reflux symptoms are under control.  Former smoker, occasional EtOH use.  Patient states her weight is stable.      Historical  "Information   Past Medical History:   Diagnosis Date    Breast lump     Colon polyp     Instability of knee joint     Lateral epicondylitis     Neoplasm of uncertain behavior of skin      Past Surgical History:   Procedure Laterality Date    BREAST EXCISIONAL BIOPSY Left 1993    benign    COLONOSCOPY      HYSTERECTOMY      age 54    KNEE SURGERY      LYMPH NODE BIOPSY      OOPHORECTOMY Bilateral     with hysterectomy, age 54     Social History     Substance and Sexual Activity   Alcohol Use Yes    Alcohol/week: 2.0 standard drinks of alcohol    Types: 2 Glasses of wine per week    Comment: Social      Social History     Substance and Sexual Activity   Drug Use Never     Social History     Tobacco Use   Smoking Status Former   Smokeless Tobacco Never     Family History   Problem Relation Age of Onset    Cancer Mother         t-cell lymphoma    Diabetes Father     No Known Problems Sister     No Known Problems Sister     No Known Problems Sister     Cancer Maternal Grandmother     Ovarian cancer Maternal Grandmother         young, guessing 40's    Cancer Maternal Grandfather     Endometrial cancer Paternal Grandmother         age unknown    No Known Problems Paternal Grandfather     No Known Problems Paternal Aunt     No Known Problems Paternal Aunt     No Known Problems Daughter     Colon cancer Neg Hx          Current Outpatient Medications:     albuterol (ProAir HFA) 90 mcg/act inhaler    dabigatran etexilate (PRADAXA) 150 mg capsu    omeprazole (PriLOSEC) 20 mg delayed release capsule  No Known Allergies  Reviewed medications and allergies and updated as indicated    PHYSICAL EXAM:    Height 5' 9\" (1.753 m), weight 81.3 kg (179 lb 3.2 oz). Body mass index is 26.46 kg/m².    Normal exam    General Appearance: NAD, cooperative, alert  Eyes: Anicteric, PERRLA, EOMI  ENT:  Normocephalic, atraumatic, normal mucosa.    Neck:  Supple, symmetrical, trachea midline  Resp:  Clear to auscultation bilaterally; no rales, " "rhonchi or wheezing; respirations unlabored   CV:  S1 S2, Regular rate and rhythm; no murmur, rub, or gallop.  GI:  Soft, non-tender, non-distended; normal bowel sounds; no masses, no organomegaly   Rectal: Deferred  Musculoskeletal: No cyanosis, clubbing or edema. Normal ROM.  Skin:  No jaundice, rashes, or lesions   Heme/Lymph: No palpable cervical lymphadenopathy  Psych: Normal affect, good eye contact  Neuro: No gross deficits, AAOx3    Lab Results:   Lab Results   Component Value Date    WBC 8.17 01/22/2024    HGB 11.8 01/22/2024    HCT 37.4 01/22/2024    MCV 92 01/22/2024     01/22/2024     Lab Results   Component Value Date     08/24/2017    K 3.5 01/22/2024     01/22/2024    CO2 26 01/22/2024    BUN 16 01/22/2024    CREATININE 0.86 01/22/2024    GLUCOSE 103 (H) 08/24/2017    CALCIUM 9.2 01/22/2024    AST 18 04/13/2023    ALT 15 04/13/2023    ALKPHOS 89 08/24/2017    PROT 6.9 08/24/2017    BILITOT 0.3 08/24/2017    EGFR 73 01/22/2024     No results found for: \"IRON\", \"TIBC\", \"FERRITIN\"  No results found for: \"LIPASE\"    Radiology Results:   CT chest wo contrast    Result Date: 6/11/2024  Narrative: CT CHEST WITHOUT IV CONTRAST INDICATION: Z87.09: Personal history of other diseases of the respiratory system. COMPARISON: None. TECHNIQUE: CT examination of the chest was performed without intravenous contrast. Multiplanar 2D reformatted images were created from the source data. This examination, like all CT scans performed in the [facility], was performed utilizing techniques to minimize radiation dose exposure, including the use of iterative reconstruction and automated exposure control. Radiation dose length product (DLP) for this visit: 342.18 mGy-cm FINDINGS: LUNGS: No focal airspace consolidation. No suspicious nodules or masses. No obvious endobronchial lesion. PLEURA: Unremarkable. HEART/GREAT VESSELS: Heart is unremarkable for patient's age. No thoracic aortic aneurysm. " No pericardial effusion. MEDIASTINUM AND SEBASTIAN: No significant mediastinal, hilar, or axillary lymphadenopathy. CHEST WALL AND LOWER NECK: Unremarkable. VISUALIZED STRUCTURES IN THE UPPER ABDOMEN: Small hiatal hernia. OSSEOUS STRUCTURES: No acute fracture or destructive osseous lesion.     Impression: No acute thoracic pathology. Workstation performed: [id number]     EGD    Result Date: 5/24/2024  Narrative: Table formatting from the original result was not included. [facility] [address] [facility] [id number] [phone] DATE OF SERVICE: 5/24/24 PHYSICIAN(S): Attending: Cliff Ordonez DO Fellow: No Staff Documented INDICATION: Gastroesophageal reflux disease with hiatal hernia POST-OP DIAGNOSIS: See the impression below. PREPROCEDURE: Informed consent was obtained for the procedure, including sedation.  Risks of perforation, hemorrhage, adverse drug reaction and aspiration were discussed. The patient was placed in the left lateral decubitus position. Patient was explained about the risks and benefits of the procedure. Risks including but not limited to bleeding, infection, and perforation were explained in detail. Also explained about less than 100% sensitivity with the exam and other alternatives. PROCEDURE: EGD DETAILS OF PROCEDURE: Patient was taken to the procedure room where a time out was performed to confirm correct patient and correct procedure. The patient underwent monitored anesthesia care, which was administered by an anesthesia professional. The patient's blood pressure, ECG and oxygen were monitored throughout the procedure. The scope was introduced through the mouth and advanced to the second part of the duodenum. Retroflexion was performed in the cardia, fundus and incisura. Prior to the procedure, the patient's H. Pylori status was unknown. The patient experienced no blood loss. The procedure was not difficult. The patient tolerated the  procedure well. There were no apparent adverse events. ANESTHESIA INFORMATION: ASA: II Anesthesia Type: IV Sedation with Anesthesia MEDICATIONS: No administrations occurring from 1414 to 1429 on 05/24/24 FINDINGS: 2 cm hiatal hernia:  Hill classification: Grade IV C0M2 Koroma's esophagus observed; performed targeted cold forceps biopsy SPECIMENS: ID Type Source Tests Collected by Time Destination 1 : Distal Esophagus; R/O Koroma's Tissue Esophagus TISSUE EXAM Cliff Ordonez DO 5/24/2024  2:24 PM      Impression: 2 cm hiatal hernia C0M2 Koroma's esophagus; performed targeted cold forceps biopsy RECOMMENDATION: Await pathology results   Cliff Ordonez DO

## 2024-06-19 ENCOUNTER — HOSPITAL ENCOUNTER (OUTPATIENT)
Dept: RADIOLOGY | Facility: HOSPITAL | Age: 64
Discharge: HOME | End: 2024-06-19
Payer: COMMERCIAL

## 2024-06-19 ENCOUNTER — OFFICE VISIT (OUTPATIENT)
Dept: FAMILY MEDICINE CLINIC | Facility: CLINIC | Age: 64
End: 2024-06-19
Payer: COMMERCIAL

## 2024-06-19 VITALS
HEART RATE: 94 BPM | SYSTOLIC BLOOD PRESSURE: 138 MMHG | BODY MASS INDEX: 26.66 KG/M2 | DIASTOLIC BLOOD PRESSURE: 80 MMHG | OXYGEN SATURATION: 98 % | WEIGHT: 180 LBS | HEIGHT: 69 IN | TEMPERATURE: 96.3 F

## 2024-06-19 DIAGNOSIS — S83.8X1A ACUTE MEDIAL MENISCAL INJURY OF RIGHT KNEE, INITIAL ENCOUNTER: Primary | ICD-10-CM

## 2024-06-19 DIAGNOSIS — S83.8X1A ACUTE MEDIAL MENISCAL INJURY OF RIGHT KNEE, INITIAL ENCOUNTER: ICD-10-CM

## 2024-06-19 PROCEDURE — 99213 OFFICE O/P EST LOW 20 MIN: CPT | Performed by: NURSE PRACTITIONER

## 2024-06-19 PROCEDURE — 73564 X-RAY EXAM KNEE 4 OR MORE: CPT

## 2024-06-19 RX ORDER — PREDNISONE 10 MG/1
TABLET ORAL
Qty: 20 TABLET | Refills: 0 | Status: SHIPPED | OUTPATIENT
Start: 2024-06-19 | End: 2024-06-26

## 2024-06-19 NOTE — PROGRESS NOTES
"Ambulatory Visit  Name: Linda Zimmerman      : 1960      MRN: [id number]  Encounter Provider: GAVI Killian  Encounter Date: 2024   Encounter department: [facility]    Assessment & Plan   1. Acute medial meniscal injury of right knee, initial encounter  -     XR knee 4+ vw right injury; Future; Expected date: 2024  -     Ambulatory Referral to Physical Therapy; Future  -     predniSONE 10 mg tablet; Take 4 tablets (40 mg total) by mouth daily for 2 days, THEN 3 tablets (30 mg total) daily for 2 days, THEN 2 tablets (20 mg total) daily for 2 days, THEN 1 tablet (10 mg total) daily for 2 days.     History of Present Illness   {Disappearing Hyperlinks I Encounters * My Last Note * Since Last Visit * History :[id number]}    Right knee pain for about a week-started she think when she was mulching. No known trauma she has been moving bags of mulch and moving furniture. Left knee is doing great LTKR in February- full flexion and full extension within 8 weeks    Has tried tylenol for right knee pain, ice and compression without improvement of symptoms. Nothing makes it feel better. Feels unstable at times and sharp pain.     No pain going up or down stairs but doesn't trust it as the lead leg.           Review of Systems   Constitutional: Negative.    Respiratory: Negative.     Cardiovascular: Negative.    Musculoskeletal:  Positive for arthralgias and joint swelling.   Neurological:  Negative for weakness and numbness.       Objective   {Disappearing Hyperlinks   Review Vitals * Enter New Vitals * Results Review * Labs * Imaging * Cardiology * Procedures * Lung Cancer Screening :[id number]}  /80   Pulse 94   Temp (!) 96.3 °F (35.7 °C) (Tympanic)   Ht 5' 9\" (1.753 m)   Wt 81.6 kg (180 lb)   SpO2 98%   BMI 26.58 kg/m²     Physical Exam  Vitals reviewed.   Constitutional:       Appearance: Normal appearance.   Cardiovascular:      Rate and Rhythm: Normal rate and regular " rhythm.      Heart sounds: Normal heart sounds.   Pulmonary:      Effort: No respiratory distress.      Breath sounds: Normal breath sounds.   Musculoskeletal:      Right knee: Decreased range of motion. Tenderness present over the medial joint line. Abnormal meniscus.   Neurological:      Mental Status: She is alert.       Administrative Statements {Disappearing Hyperlinks I  Level of Service * [facility]}

## 2024-06-25 ENCOUNTER — EVALUATION (OUTPATIENT)
Dept: PHYSICAL THERAPY | Facility: CLINIC | Age: 64
End: 2024-06-25
Payer: COMMERCIAL

## 2024-06-25 DIAGNOSIS — S83.8X1A ACUTE MEDIAL MENISCAL INJURY OF RIGHT KNEE, INITIAL ENCOUNTER: Primary | ICD-10-CM

## 2024-06-25 DIAGNOSIS — M25.561 ACUTE PAIN OF RIGHT KNEE: ICD-10-CM

## 2024-06-25 DIAGNOSIS — R26.2 DIFFICULTY WALKING: ICD-10-CM

## 2024-06-25 PROCEDURE — 97140 MANUAL THERAPY 1/> REGIONS: CPT | Performed by: PHYSICAL THERAPIST

## 2024-06-25 PROCEDURE — 97110 THERAPEUTIC EXERCISES: CPT | Performed by: PHYSICAL THERAPIST

## 2024-06-25 PROCEDURE — 97162 PT EVAL MOD COMPLEX 30 MIN: CPT | Performed by: PHYSICAL THERAPIST

## 2024-06-25 NOTE — PROGRESS NOTES
PT Evaluation     Today's date: 2024  Patient name: Linda Zimmerman  : 1960  MRN: [id number]  Referring provider: Krystle Barnett, *  Dx:   Encounter Diagnosis     ICD-10-CM    1. Acute medial meniscal injury of right knee, initial encounter  S83.8X1A Ambulatory Referral to Physical Therapy      2. Acute pain of right knee  M25.561       3. Difficulty walking  R26.2                      Assessment  Impairments: abnormal gait, abnormal or restricted ROM, abnormal movement, activity intolerance, impaired balance, impaired physical strength, lacks appropriate home exercise program, pain with function, weight-bearing intolerance and poor body mechanics  Symptom irritability: high    Assessment details: Pt presents w/ symptoms consistent w/ likely R meniscal tear. She presents w/ + Марина's test. She demonstrates abnormal gait pattern and reports pain w/ WBing activities. She presents w/ ROM WFL in R knee, though some limited flexion is noted in R knee, which likely contributes to abnormal gait. She presents w/ R tibial medial shift at rest, along w/ hypomobile lateral glides and limited tibial ER ROM. These contribute to abnormal biomechanics in R knee and increased stress. She presents w/ impaired strength in R hip flexion, knee flexion, and knee extension. She would benefit from physical therapy to address these impairments and to maximize functional abilities.   Barriers to therapy: L TKA  - limited flexion ROM  Understanding of Dx/Px/POC: good     Prognosis: good    Goals  STG (4 weeks):    Decreased R knee pain to at most 4/10.  Improve R hip flexion strength to at least 3+/5 MMT.  Patient will be able to walk community distances w/ normalized R knee flexion in her gait pattern.     LTG (8-12-weeks):  Pt will be independent w/ HEP.  Pt will be able to walk consistently w/o R knee pain.  Improve R knee flexion and extension strength to at least 4+/5 MMT.        Plan  Patient would benefit  from: skilled physical therapy  Planned modality interventions: biofeedback, cryotherapy, TENS, thermotherapy: hydrocollator packs, manual electrical stimulation and neuromuscular electric stimulation    Planned therapy interventions: abdominal trunk stabilization, ADL retraining, activity modification, balance/weight bearing training, body mechanics training, gait training, home exercise program, transfer training, therapeutic exercise, therapeutic activities, stretching, strengthening, postural training, patient/caregiver education, neuromuscular re-education, motor coordination training, Kolb taping, massage, manual therapy, kinesiology taping, joint mobilization and IASTM    Frequency: 1-2x week  Duration in weeks: 12  Plan of Care beginning date: 6/25/2024  Plan of Care expiration date: 9/17/2024  Treatment plan discussed with: patient  Plan details: Pt will be I in HEP and will be able to perform ADLs w/ minimal to no R knee pain.           Subjective Evaluation    History of Present Illness  Mechanism of injury: About a week and a half ago she twisted onto R leg w/ bags of mulch and she felt a pop in her R knee w/ pain. Had to help a friend move furniture later that day and it's been really bothering her since. Saw doc who did x-ray and sent her to PT.  Getting up after sitting is the worst. Once she's up and around she can do a lot. Feels like it might give out when she first stands. It's painful in medial R knee. Driving sometimes is bothersome. Sleeping is ok. She doesn't usually feel pain, so this has been concerning for her. She's continued doing her active lifestyle (walks 15-20k steps/day) though it does hurt.  Denies tingling/numbness.  Had L TKA in February and that's doing well.   Quality of life: good    Patient Goals  Patient goals for therapy: decreased pain, increased strength, return to sport/leisure activities, return to work and independence with ADLs/IADLs  Patient goal: would like to  be able to walk w/o pain in R knee  Pain  Current pain ratin  At best pain ratin  At worst pain ratin  Location: R knee  Quality: sharp          Objective    (*=pain)    Palpation:   TTP medial, inferior R knee, pes anserines, patellar tendon, medial jt line    Joint Mobility:  Tibial IR   WNL R  Tibial ER   15 degrees R  Medial tibial glide  Hypomobile R  Lateral tibial glide  Hypomobile R    A/PROM:   Left   Right  Knee flexion    110 *   125*  Knee extension  0   0    Strength:   Left   Right  Knee flex   4+/5   4/5*  Knee ext   4+/5   4-/5*    Hip flex    4+/5   3-/5*  Hip ER    4+/5   4+/5  Hip ABD   4/5   4+/5    Special Tests:    Valgus Stress   -   -  Varus Stress   -   +  McMurrary's   NT   +  Anterior Drawer      -  Posterior Drawer     -    Function:   Ambulation - decreased R stance time, R lateral trunk lean, maintained R knee extension t/o gait    Precautions: L TKA , universal    Manuals (pt consent)  IE        R tibial JM Distraction G3-4    Lateral tibial glide on femur G3-4    MWM into tibial ER                                   Neuro Re-Ed                                                                        Ther Ex         Patient education Condition and POC        Self tibial distraction Seated 10x5 sec HEP        Quad sets 10x5 sec (can progress to LAQ if painfree)        LAQ         Hip flexion                                    Ther Activity         Sit<>stand                  Gait Training         Ambulation on level surface                  Modalities

## 2024-07-01 ENCOUNTER — OFFICE VISIT (OUTPATIENT)
Dept: PHYSICAL THERAPY | Facility: CLINIC | Age: 64
End: 2024-07-01
Payer: COMMERCIAL

## 2024-07-01 DIAGNOSIS — S83.8X1A ACUTE MEDIAL MENISCAL INJURY OF RIGHT KNEE, INITIAL ENCOUNTER: Primary | ICD-10-CM

## 2024-07-01 DIAGNOSIS — M25.561 ACUTE PAIN OF RIGHT KNEE: ICD-10-CM

## 2024-07-01 DIAGNOSIS — R26.2 DIFFICULTY WALKING: ICD-10-CM

## 2024-07-01 PROCEDURE — 97116 GAIT TRAINING THERAPY: CPT | Performed by: PHYSICAL THERAPIST

## 2024-07-01 PROCEDURE — 97110 THERAPEUTIC EXERCISES: CPT | Performed by: PHYSICAL THERAPIST

## 2024-07-01 NOTE — PROGRESS NOTES
Daily Note     Today's date: 2024  Patient name: Linda Zimmerman  : 1960  MRN: [id number]  Referring provider: Lavon Martin PA-C  Dx:   Encounter Diagnosis     ICD-10-CM    1. Acute medial meniscal injury of right knee, initial encounter  S83.8X1A       2. Acute pain of right knee  M25.561       3. Difficulty walking  R26.2                      Subjective: Is feeling very good! It's not hurting lately. Stairs are better. Has been walking less but it's not hurting now. Has even been able to do a little bit of light yoga.      Objective: See treatment diary below      Assessment: Tolerated treatment well. Patient would benefit from continued PT. Pt responded very well to manual tx and is progressing well. Normalized R tibiofemoral alignment today, w/ normalized R tibial IR/ER ROM. Mild soreness w/ step downs, so discontinued those. However, tolerated other there ex very well and notably improved gait today post training.       Plan: Continue per plan of care.      Diagnosis:  TKA  Left   Precautions:  Surgery date  24  PE in both lungs 2024  Hx of DVT   Comparable signs 1)   2)  3)    Primary Impairments: 1)   2)   Patient Goals STS, stairs, squat, gait, in/out bed/car, disturbed sleep, gardening       Precautions:   Past Medical History:   Diagnosis Date    Breast lump     Instability of knee joint     Lateral epicondylitis     Neoplasm of uncertain behavior of skin        Precautions: L TKA , universal    Manuals (pt consent)  IE        R tibial JM Distraction G3-4    Lateral tibial glide on femur G3-4    MWM into tibial ER Distraction G3-4                                  Neuro Re-Ed                                                                        Ther Ex         Patient education Condition and POC        Self tibial distraction Seated 10x5 sec HEP Seated 10x5 sec HEP       Quad sets 10x5 sec (can progress to LAQ if painfree) 10x5 sec HEP       LAQ  3x10 HEP        Hip flexion  2x10 seated HEP       SLR hip flexion w/quad set  2x10 HEP                         Ther Activity         Sit<>stand                  Gait Training         Ambulation on level surface  X10 mins edu on glute activation, avoiding scissoring gait, and heel strike w/ slight knee flexion, w/ SPC                Modalities

## 2024-07-17 ENCOUNTER — OFFICE VISIT (OUTPATIENT)
Dept: PHYSICAL THERAPY | Facility: CLINIC | Age: 64
End: 2024-07-17
Payer: COMMERCIAL

## 2024-07-17 DIAGNOSIS — S83.8X1A ACUTE MEDIAL MENISCAL INJURY OF RIGHT KNEE, INITIAL ENCOUNTER: Primary | ICD-10-CM

## 2024-07-17 DIAGNOSIS — R26.2 DIFFICULTY WALKING: ICD-10-CM

## 2024-07-17 DIAGNOSIS — M25.561 ACUTE PAIN OF RIGHT KNEE: ICD-10-CM

## 2024-07-17 PROCEDURE — 97530 THERAPEUTIC ACTIVITIES: CPT

## 2024-07-17 PROCEDURE — 97110 THERAPEUTIC EXERCISES: CPT

## 2024-07-17 NOTE — PROGRESS NOTES
"Daily Note     Today's date: 2024  Patient name: Linda Zimmerman  : 1960  MRN: [id number]  Referring provider: Lavon Martin, PAEdwardC  Dx:   Encounter Diagnosis     ICD-10-CM    1. Acute medial meniscal injury of right knee, initial encounter  S83.8X1A       2. Acute pain of right knee  M25.561       3. Difficulty walking  R26.2                        Subjective: Linda reports improvement in R knee pain throughout daily activities. Notes she has some challenge/pain with descending steps, \"I just have to think about it when going down with my RLE\".      Objective: See treatment diary below      Assessment: Linda tolerated PT treatment well. Pt continues to responds favorably to manual tibial distraction, normalized ROM. Advanced functional strengthening today, quad and glutes focused. Cues required with STS and stair navigation for glute activation. Pt would benefit from continued PT to further address impairments and maximize functional level.          Plan: Continue per plan of care.      Diagnosis:  TKA  Left   Precautions:  Surgery date  24  PE in both lungs 2024  Hx of DVT   Comparable signs 1)   2)  3)    Primary Impairments: 1)   2)   Patient Goals STS, stairs, squat, gait, in/out bed/car, disturbed sleep, gardening       Precautions:   Past Medical History:   Diagnosis Date    Breast lump     Instability of knee joint     Lateral epicondylitis     Neoplasm of uncertain behavior of skin        Precautions: L TKA , universal    Manuals (pt consent)  IE       R tibial JM Distraction G3-4    Lateral tibial glide on femur G3-4    MWM into tibial ER Distraction G3-4 Distraction G3-4                                 Neuro Re-Ed                                                                        Ther Ex         Patient education Condition and POC        Self tibial distraction Seated 10x5 sec HEP Seated 10x5 sec HEP Reviewed       Quad sets 10x5 sec (can progress to LAQ " "if painfree) 10x5 sec HEP :10x10       LAQ  3x10 HEP       Hip flexion  2x10 seated HEP 2x10 seated       SLR hip flexion w/quad set  2x10 HEP 3x10       Bridge    :05 2x10       S/l hip abd    2x10 ea       Ther Activity         Sit<>stand   2x10 chair       Fwd step up   6\" 2x10 ea glute activation       U/l LP   30/ 2x10 ea       Gait Training         Ambulation on level surface  X10 mins edu on glute activation, avoiding scissoring gait, and heel strike w/ slight knee flexion, w/ SPC                Modalities                                  "

## 2024-07-23 ENCOUNTER — OFFICE VISIT (OUTPATIENT)
Dept: PHYSICAL THERAPY | Facility: CLINIC | Age: 64
End: 2024-07-23
Payer: COMMERCIAL

## 2024-07-23 DIAGNOSIS — R26.2 DIFFICULTY WALKING: ICD-10-CM

## 2024-07-23 DIAGNOSIS — M25.561 ACUTE PAIN OF RIGHT KNEE: ICD-10-CM

## 2024-07-23 DIAGNOSIS — S83.8X1A ACUTE MEDIAL MENISCAL INJURY OF RIGHT KNEE, INITIAL ENCOUNTER: Primary | ICD-10-CM

## 2024-07-23 PROCEDURE — 97110 THERAPEUTIC EXERCISES: CPT

## 2024-07-23 NOTE — PROGRESS NOTES
Discharge     Today's date: 2024  Patient name: Linda Zimmerman  : 1960  MRN: [id number]  Referring provider: Lavon Martin PA-C  Dx:   Encounter Diagnosis     ICD-10-CM    1. Acute medial meniscal injury of right knee, initial encounter  S83.8X1A       2. Acute pain of right knee  M25.561       3. Difficulty walking  R26.2                      Subjective: Stair management is getting easier, up and down. Overall, less swelling. She continues to keep up with yoga and HEP, and she has been relatively pain free.   She is feeling good overall and feels ready for DC.      Objective: See treatment diary below      Assessment: Tolerated treatment well. Linda zimmerntrated good form with her program, completing without adverse effects. EDU on appropriate progressions for her HEP. She has progressed well overall and is appropriate for DC to HEP at this time.       Plan: D/c with HEP for maintenance.        Diagnosis:  TKA  Left   Precautions:  Surgery date  24  PE in both lungs 2024  Hx of DVT   Comparable signs 1)   2)  3)    Primary Impairments: 1)   2)   Patient Goals STS, stairs, squat, gait, in/out bed/car, disturbed sleep, gardening       Precautions:   Past Medical History:   Diagnosis Date    Breast lump     Instability of knee joint     Lateral epicondylitis     Neoplasm of uncertain behavior of skin        Precautions: L TKA , universal    Manuals (pt consent)  IE      R tibial JM Distraction G3-4    Lateral tibial glide on femur G3-4    MWM into tibial ER Distraction G3-4 Distraction G3-4                                 Neuro Re-Ed                                                                        Ther Ex         Patient education Condition and POC        Self tibial distraction Seated 10x5 sec HEP Seated 10x5 sec HEP Reviewed       Quad sets 10x5 sec (can progress to LAQ if painfree) 10x5 sec HEP :10x10  :10x10      LAQ  3x10 HEP       Hip flexion  2x10  "seated HEP 2x10 seated  2x10 seated /c pause     SLR hip flexion w/quad set  2x10 HEP 3x10  3x10     Bridge    :05 2x10  :05 2x10      S/l hip abd    2x10 ea  2x10 ea     Ther Activity         Sit<>stand   2x10 chair  2x10 chair      Fwd step up   6\" 2x10 ea glute activation  6\" 2x10 ea glute activation      U/l LP   30/ 2x10 ea  30/ 2x10 ea      Gait Training         Ambulation on level surface  X10 mins edu on glute activation, avoiding scissoring gait, and heel strike w/ slight knee flexion, w/ SPC                Modalities                                    "

## 2024-07-30 ENCOUNTER — APPOINTMENT (OUTPATIENT)
Dept: PHYSICAL THERAPY | Facility: CLINIC | Age: 64
End: 2024-07-30
Payer: COMMERCIAL

## 2024-10-14 DIAGNOSIS — K44.9 GASTROESOPHAGEAL REFLUX DISEASE WITH HIATAL HERNIA: ICD-10-CM

## 2024-10-14 DIAGNOSIS — K21.9 GASTROESOPHAGEAL REFLUX DISEASE WITH HIATAL HERNIA: ICD-10-CM

## 2025-01-03 ENCOUNTER — OFFICE VISIT (OUTPATIENT)
Dept: GASTROENTEROLOGY | Facility: CLINIC | Age: 65
End: 2025-01-03
Payer: COMMERCIAL

## 2025-01-03 VITALS
HEIGHT: 69 IN | DIASTOLIC BLOOD PRESSURE: 78 MMHG | SYSTOLIC BLOOD PRESSURE: 122 MMHG | BODY MASS INDEX: 27.76 KG/M2 | WEIGHT: 187.4 LBS

## 2025-01-03 DIAGNOSIS — Z86.0100 HISTORY OF COLON POLYPS: ICD-10-CM

## 2025-01-03 DIAGNOSIS — K21.9 GASTROESOPHAGEAL REFLUX DISEASE, UNSPECIFIED WHETHER ESOPHAGITIS PRESENT: Primary | ICD-10-CM

## 2025-01-03 DIAGNOSIS — Z86.711 HX OF PULMONARY EMBOLUS: ICD-10-CM

## 2025-01-03 DIAGNOSIS — Z87.11 HX OF GASTRIC ULCER: ICD-10-CM

## 2025-01-03 PROCEDURE — 99213 OFFICE O/P EST LOW 20 MIN: CPT | Performed by: NURSE PRACTITIONER

## 2025-01-03 NOTE — PROGRESS NOTES
Name: Linda Zimmerman      : 1960      MRN: [id number]  Encounter Provider: GAVI Plummer  Encounter Date: 1/3/2025   Encounter department: On license of [facility]  :  Assessment & Plan  Gastroesophageal reflux disease, unspecified whether esophagitis present  Patient with past medical history of increased acid reflux symptoms.  She states that taking omeprazole 20 mg daily has been working well for her.  She denies any breakthrough or alarm symptoms.  Some concern for osteo porosis says side effect with PPI.  Discussed with patient can trial taking the omeprazole every other day and see if that works for her as well as dietary discretion and lifestyle changes.  Patient will communicate via [employer] with success of trial of every other day taking the omeprazole.    Most recent EGD 2024; 2 cm hiatal hernia, biopsies negative.     Hx of gastric ulcer  Past medical history of gastric ulcer as a teenager.  EGD from 2024 clear for any gastric ulcer findings       Hx of pulmonary embolus  Patient with history of pulmonary embolus and DVT.  She had been taking Pradaxa twice daily however states that that medication has been discontinued.     History of colon polyps  Colonoscopy 2020; 10-year recall       Follow-up in 1 year or as needed.    History of Present Illness   Abdominal Pain  This is a recurrent problem. The current episode started more than 1 year ago. The onset quality is gradual. The problem occurs rarely. The most recent episode lasted 2 hours. The problem has been gradually improving. The pain is located in the epigastric region. The pain is at a severity of 0/10. The quality of the pain is burning. The abdominal pain does not radiate. Pertinent negatives include no anorexia, arthralgias, belching, constipation, diarrhea, dysuria, fever, flatus, frequency, headaches, hematochezia, hematuria, melena, myalgias, nausea, vomiting or weight loss. The  "pain is aggravated by certain positions and eating. The pain is relieved by Movement. Prior diagnostic workup includes upper endoscopy.     Linda Zimmerman is a 64 y.o. female with medical history of pulmonary embolus, DVT, gastric ulcer and GERD presents for annual follow-up and medication review.  Patient states that she has been now taken off Pradaxa for anticoagulation therapy.  She states her omeprazole 20 mg daily has significantly helped her with her GERD symptoms.  Discussed with patient possibly going every other day taking the omeprazole including lifestyle changes may work best.  There was some concern about osteoporosis however patient's most recent DEXA scan appear to be okay.  We did talk about dietary supplement with calcium, vitamin D.  On exam, patient denies nausea, vomiting or abdominal pain.  She states is having daily normal bowel movements.  Denies hematochezia or melena.  States her weight is stable.      Review of Systems   Constitutional:  Negative for fever and weight loss.   Gastrointestinal:  Positive for abdominal pain. Negative for anorexia, constipation, diarrhea, flatus, hematochezia, melena, nausea and vomiting.   Genitourinary:  Negative for dysuria, frequency and hematuria.   Musculoskeletal:  Negative for arthralgias and myalgias.   Neurological:  Negative for headaches.   All other systems reviewed and are negative.         Objective   /78   Ht 5' 9\" (1.753 m)   Wt 85 kg (187 lb 6.4 oz)   BMI 27.67 kg/m²      Physical Exam  HENT:      Head: Normocephalic.   Cardiovascular:      Rate and Rhythm: Normal rate.      Heart sounds: Normal heart sounds.   Pulmonary:      Effort: Pulmonary effort is normal.      Breath sounds: Normal breath sounds.   Abdominal:      General: Abdomen is flat. Bowel sounds are normal. There is no distension.      Palpations: Abdomen is soft.      Tenderness: There is no abdominal tenderness.   Musculoskeletal:         General: Normal range of " motion.      Cervical back: Normal range of motion.   Skin:     General: Skin is warm and dry.   Neurological:      Mental Status: She is alert and oriented to person, place, and time.   Psychiatric:         Mood and Affect: Mood normal.

## 2025-01-10 ENCOUNTER — OFFICE VISIT (OUTPATIENT)
Dept: FAMILY MEDICINE CLINIC | Facility: CLINIC | Age: 65
End: 2025-01-10
Payer: COMMERCIAL

## 2025-01-10 VITALS
SYSTOLIC BLOOD PRESSURE: 116 MMHG | BODY MASS INDEX: 27.85 KG/M2 | WEIGHT: 188 LBS | TEMPERATURE: 98.1 F | HEIGHT: 69 IN | HEART RATE: 73 BPM | DIASTOLIC BLOOD PRESSURE: 72 MMHG | OXYGEN SATURATION: 96 %

## 2025-01-10 DIAGNOSIS — Z13.220 SCREENING CHOLESTEROL LEVEL: ICD-10-CM

## 2025-01-10 DIAGNOSIS — Z86.718 HISTORY OF DVT (DEEP VEIN THROMBOSIS): ICD-10-CM

## 2025-01-10 DIAGNOSIS — Z00.00 ANNUAL PHYSICAL EXAM: Primary | ICD-10-CM

## 2025-01-10 DIAGNOSIS — Z13.29 SCREENING FOR THYROID DISORDER: ICD-10-CM

## 2025-01-10 DIAGNOSIS — Z13.0 SCREENING FOR DEFICIENCY ANEMIA: ICD-10-CM

## 2025-01-10 DIAGNOSIS — Z13.1 SCREENING FOR DIABETES MELLITUS: ICD-10-CM

## 2025-01-10 DIAGNOSIS — Z86.711 HISTORY OF PULMONARY EMBOLUS (PE): ICD-10-CM

## 2025-01-10 DIAGNOSIS — K44.9 GASTROESOPHAGEAL REFLUX DISEASE WITH HIATAL HERNIA: ICD-10-CM

## 2025-01-10 DIAGNOSIS — K21.9 GASTROESOPHAGEAL REFLUX DISEASE WITH HIATAL HERNIA: ICD-10-CM

## 2025-01-10 PROBLEM — I26.99 BILATERAL PULMONARY EMBOLISM (HCC): Status: RESOLVED | Noted: 2024-03-06 | Resolved: 2025-01-10

## 2025-01-10 PROCEDURE — 99396 PREV VISIT EST AGE 40-64: CPT | Performed by: NURSE PRACTITIONER

## 2025-01-10 NOTE — ASSESSMENT & PLAN NOTE
Hematology work up negative  Finished 6 months of pradaxa  Future surgeries will need lovenox post operatively for DVT
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
## 2025-02-07 NOTE — TELEPHONE ENCOUNTER
Received a request from patient for a referral for Dr. Nunn on 12/20/23.  Completed as follows:      Confirmation - Referral [id number]  Effective: 12/18/2023     Expires: 03/17/2024  Active  Referred From  [facility]  Specialty: [facility]  Group NPI: [id number]  Provider ID: [id number]  Tax ID: [id number]  [address] [place] [address]  Referred To  DANILO DAWSON MD  Specialty: Orthopedic Surgery  Tier 1  Group NPI: [id number]  Provider ID: [id number]  Tax ID: [id number]  Individual Provider NPI: [id number]  Provider Name: DANILO NUNN MD  This referral is valid at any location for the above group  Patient Info  VIRIDIANA HARRIS  [id number]  Female  1960  [address] [place] [id number]  Clinical Information  Place of Service  Office  Service Type  Medical Care  Diagnosis  1M25.462 - effusion, left knee  Procedures  199499 - unlisted evaluation and management service       safety/verbal cues/1 person assist

## 2025-02-15 LAB
ALBUMIN SERPL-MCNC: 4.3 G/DL (ref 3.9–4.9)
ALP SERPL-CCNC: 122 IU/L (ref 44–121)
ALT SERPL-CCNC: 13 IU/L (ref 0–32)
AST SERPL-CCNC: 17 IU/L (ref 0–40)
BASOPHILS # BLD AUTO: 0.1 X10E3/UL (ref 0–0.2)
BASOPHILS NFR BLD AUTO: 1 %
BILIRUB SERPL-MCNC: 0.3 MG/DL (ref 0–1.2)
BUN SERPL-MCNC: 15 MG/DL (ref 8–27)
BUN/CREAT SERPL: 18 (ref 12–28)
CALCIUM SERPL-MCNC: 9.7 MG/DL (ref 8.7–10.3)
CHLORIDE SERPL-SCNC: 104 MMOL/L (ref 96–106)
CHOLEST SERPL-MCNC: 231 MG/DL (ref 100–199)
CO2 SERPL-SCNC: 21 MMOL/L (ref 20–29)
CREAT SERPL-MCNC: 0.85 MG/DL (ref 0.57–1)
EGFR: 77 ML/MIN/1.73
EOSINOPHIL # BLD AUTO: 0.2 X10E3/UL (ref 0–0.4)
EOSINOPHIL NFR BLD AUTO: 4 %
ERYTHROCYTE [DISTWIDTH] IN BLOOD BY AUTOMATED COUNT: 14.4 % (ref 11.7–15.4)
GLOBULIN SER-MCNC: 3 G/DL (ref 1.5–4.5)
GLUCOSE SERPL-MCNC: 100 MG/DL (ref 70–99)
HCT VFR BLD AUTO: 40.4 % (ref 34–46.6)
HDLC SERPL-MCNC: 73 MG/DL
HGB BLD-MCNC: 12.8 G/DL (ref 11.1–15.9)
IMM GRANULOCYTES # BLD: 0 X10E3/UL (ref 0–0.1)
IMM GRANULOCYTES NFR BLD: 0 %
LDLC SERPL CALC-MCNC: 134 MG/DL (ref 0–99)
LYMPHOCYTES # BLD AUTO: 1.7 X10E3/UL (ref 0.7–3.1)
LYMPHOCYTES NFR BLD AUTO: 32 %
MCH RBC QN AUTO: 27.5 PG (ref 26.6–33)
MCHC RBC AUTO-ENTMCNC: 31.7 G/DL (ref 31.5–35.7)
MCV RBC AUTO: 87 FL (ref 79–97)
MONOCYTES # BLD AUTO: 0.4 X10E3/UL (ref 0.1–0.9)
MONOCYTES NFR BLD AUTO: 7 %
NEUTROPHILS # BLD AUTO: 2.9 X10E3/UL (ref 1.4–7)
NEUTROPHILS NFR BLD AUTO: 56 %
PLATELET # BLD AUTO: 348 X10E3/UL (ref 150–450)
POTASSIUM SERPL-SCNC: 4.4 MMOL/L (ref 3.5–5.2)
PROT SERPL-MCNC: 7.3 G/DL (ref 6–8.5)
RBC # BLD AUTO: 4.65 X10E6/UL (ref 3.77–5.28)
SL AMB VLDL CHOLESTEROL CALC: 24 MG/DL (ref 5–40)
SODIUM SERPL-SCNC: 139 MMOL/L (ref 134–144)
TRIGL SERPL-MCNC: 138 MG/DL (ref 0–149)
TSH SERPL DL<=0.005 MIU/L-ACNC: 0.94 UIU/ML (ref 0.45–4.5)
WBC # BLD AUTO: 5.2 X10E3/UL (ref 3.4–10.8)

## 2025-02-18 ENCOUNTER — RESULTS FOLLOW-UP (OUTPATIENT)
Dept: FAMILY MEDICINE CLINIC | Facility: CLINIC | Age: 65
End: 2025-02-18

## 2025-02-18 NOTE — RESULT ENCOUNTER NOTE
Hi Linda, Your labs show your cholesterol is a little higher than a year ago otherwise labs look okay. Recommend a heart healthy diet, increasing physical activity like walking. Repeat labs in 1 year

## 2025-03-25 ENCOUNTER — OFFICE VISIT (OUTPATIENT)
Dept: FAMILY MEDICINE CLINIC | Facility: CLINIC | Age: 65
End: 2025-03-25
Payer: COMMERCIAL

## 2025-03-25 ENCOUNTER — NURSE TRIAGE (OUTPATIENT)
Age: 65
End: 2025-03-25

## 2025-03-25 VITALS
HEART RATE: 95 BPM | TEMPERATURE: 98.2 F | BODY MASS INDEX: 27.85 KG/M2 | WEIGHT: 188 LBS | DIASTOLIC BLOOD PRESSURE: 70 MMHG | HEIGHT: 69 IN | SYSTOLIC BLOOD PRESSURE: 120 MMHG | OXYGEN SATURATION: 98 %

## 2025-03-25 DIAGNOSIS — M62.838 MUSCLE SPASM: ICD-10-CM

## 2025-03-25 DIAGNOSIS — M94.0 COSTOCHONDRITIS: ICD-10-CM

## 2025-03-25 DIAGNOSIS — S16.1XXA CERVICAL STRAIN, ACUTE, INITIAL ENCOUNTER: ICD-10-CM
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
## 2025-03-25 DIAGNOSIS — Z86.718 HISTORY OF DVT (DEEP VEIN THROMBOSIS): ICD-10-CM

## 2025-03-25 PROBLEM — B34.9 VIRAL SYNDROME: Status: RESOLVED | Noted: 2024-02-16 | Resolved: 2025-03-25

## 2025-03-25 PROBLEM — Z01.818 PRE-OP EXAMINATION: Status: RESOLVED | Noted: 2024-02-02 | Resolved: 2025-03-25

## 2025-03-25 PROBLEM — R39.9 UTI SYMPTOMS: Status: RESOLVED | Noted: 2024-02-16 | Resolved: 2025-03-25

## 2025-03-25 PROBLEM — Z23 ENCOUNTER FOR IMMUNIZATION: Status: RESOLVED | Noted: 2024-02-02 | Resolved: 2025-03-25

## 2025-03-25 PROBLEM — Z87.09 HISTORY OF PULMONARY INFARCTION: Status: ACTIVE | Noted: 2025-03-25

## 2025-03-25 PROBLEM — M17.12 PRIMARY OSTEOARTHRITIS OF LEFT KNEE: Status: ACTIVE | Noted: 2020-03-16

## 2025-03-25 PROCEDURE — 99214 OFFICE O/P EST MOD 30 MIN: CPT | Performed by: FAMILY MEDICINE
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
## 2025-03-26 NOTE — ASSESSMENT & PLAN NOTE
Right paraspinal cervical spine. Trial of tizanidine at bedtime  Orders:    tiZANidine (ZANAFLEX) 4 mg tablet; Take 1 tablet (4 mg total) by mouth daily at bedtime

## 2025-03-26 NOTE — ASSESSMENT & PLAN NOTE
Anterior rib 3-4. Avoid lifting, pushing, pulling. No lifting, avoid working out until symptoms improve

## 2025-04-07 DIAGNOSIS — K44.9 GASTROESOPHAGEAL REFLUX DISEASE WITH HIATAL HERNIA: ICD-10-CM

## 2025-04-07 DIAGNOSIS — K21.9 GASTROESOPHAGEAL REFLUX DISEASE WITH HIATAL HERNIA: ICD-10-CM

## 2025-04-07 RX ORDER — OMEPRAZOLE 20 MG/1
20 CAPSULE, DELAYED RELEASE ORAL
Qty: 30 CAPSULE | Refills: 5 | Status: SHIPPED | OUTPATIENT
Start: 2025-04-07

## 2025-04-17 ENCOUNTER — HOSPITAL ENCOUNTER (OUTPATIENT)
Dept: MAMMOGRAPHY | Facility: IMAGING CENTER | Age: 65
Discharge: HOME | End: 2025-04-17
Payer: COMMERCIAL

## 2025-04-17 VITALS — WEIGHT: 188 LBS | BODY MASS INDEX: 27.85 KG/M2 | HEIGHT: 69 IN

## 2025-04-17 DIAGNOSIS — Z12.31 ENCOUNTER FOR SCREENING MAMMOGRAM FOR MALIGNANT NEOPLASM OF BREAST: ICD-10-CM

## 2025-04-17 PROCEDURE — 77063 BREAST TOMOSYNTHESIS BI: CPT

## 2025-04-17 PROCEDURE — 77067 SCR MAMMO BI INCL CAD: CPT

## 2025-05-21 ENCOUNTER — OFFICE VISIT (OUTPATIENT)
Dept: FAMILY MEDICINE CLINIC | Facility: CLINIC | Age: 65
End: 2025-05-21
Payer: COMMERCIAL

## 2025-05-21 VITALS
WEIGHT: 189 LBS | BODY MASS INDEX: 27.99 KG/M2 | SYSTOLIC BLOOD PRESSURE: 112 MMHG | HEART RATE: 100 BPM | HEIGHT: 69 IN | DIASTOLIC BLOOD PRESSURE: 58 MMHG | OXYGEN SATURATION: 99 % | TEMPERATURE: 96.5 F

## 2025-05-21 DIAGNOSIS — M67.471 GANGLION CYST OF RIGHT FOOT: Primary | ICD-10-CM

## 2025-05-21 DIAGNOSIS — S83.8X1D ACUTE MEDIAL MENISCAL INJURY OF RIGHT KNEE, SUBSEQUENT ENCOUNTER: ICD-10-CM

## 2025-05-21 DIAGNOSIS — G89.29 CHRONIC PAIN OF RIGHT KNEE: ICD-10-CM

## 2025-05-21 DIAGNOSIS — M25.561 CHRONIC PAIN OF RIGHT KNEE: ICD-10-CM

## 2025-05-21 PROCEDURE — 99214 OFFICE O/P EST MOD 30 MIN: CPT | Performed by: NURSE PRACTITIONER

## 2025-05-21 NOTE — PROGRESS NOTES
"Name: Linda Zimmerman      : 1960      MRN: [id number]  Encounter Provider: GAVI Killian  Encounter Date: 2025   Encounter department: [facility]  :  Assessment & Plan  Ganglion cyst of right foot  Suspect ganglion cyst doesn't appear to be mucoid cyst  Referrl to podiatry  Orders:    Ambulatory Referral to Podiatry; Future    Chronic pain of right knee  Given consistent pain of right knee with xray in 2024, failed to improve with PT will get MRI then discuss seeing ortho  Suspect meniscus tear  Orders:    MRI knee right  wo contrast; Future    Acute medial meniscal injury of right knee, subsequent encounter    Orders:    MRI knee right  wo contrast; Future           History of Present Illness     About 2 months ago noticed lump on right big toe      Continues with right knee pain- Started 2024, had xray which showed: Mild medial compartment degenerative changes as above.  Mild varus angulation secondarily.  Mild marginal osteophyte formation.  Small patellar enthesopathy.  No fracture  Did physical therapy without resolution of symptoms  Continues to feel unstable, worse going up stairs          Review of Systems   Constitutional: Negative.  Negative for fatigue and fever.   HENT: Negative.     Eyes: Negative.    Respiratory: Negative.  Negative for cough.    Cardiovascular: Negative.    Gastrointestinal: Negative.    Endocrine: Negative.    Genitourinary: Negative.    Musculoskeletal:  Positive for arthralgias and joint swelling. Negative for neck pain.   Skin: Negative.    Allergic/Immunologic: Negative.    Neurological: Negative.    Psychiatric/Behavioral: Negative.         Objective   /58 (BP Location: Right arm, Patient Position: Sitting, Cuff Size: Large)   Pulse 100   Temp (!) 96.5 °F (35.8 °C) (Tympanic)   Ht 5' 9\" (1.753 m)   Wt 85.7 kg (189 lb)   SpO2 99%   BMI 27.91 kg/m²      Physical Exam  Vitals and nursing note reviewed. "   Constitutional:       General: She is not in acute distress.     Appearance: Normal appearance. She is well-developed.   HENT:      Head: Normocephalic and atraumatic.      Right Ear: Ear canal normal.      Left Ear: Ear canal normal.      Nose: Nose normal.      Mouth/Throat:      Mouth: Mucous membranes are moist.      Pharynx: Oropharynx is clear.     Eyes:      Conjunctiva/sclera: Conjunctivae normal.      Pupils: Pupils are equal, round, and reactive to light.     Neck:      Thyroid: No thyromegaly or thyroid tenderness.     Cardiovascular:      Rate and Rhythm: Normal rate and regular rhythm.      Pulses:           Radial pulses are 2+ on the right side and 2+ on the left side.      Heart sounds: Normal heart sounds. No murmur heard.  Pulmonary:      Effort: Pulmonary effort is normal. No respiratory distress.      Breath sounds: Normal breath sounds.   Abdominal:      General: Bowel sounds are normal.      Palpations: Abdomen is soft.      Tenderness: There is no abdominal tenderness.     Musculoskeletal:      Cervical back: Neck supple.      Right knee: Decreased range of motion. Tenderness present over the medial joint line. Abnormal meniscus.      Right lower leg: No edema.      Left lower leg: No edema.      Right foot: Normal range of motion. Deformity present.   Feet:      Right foot:      Skin integrity: Skin integrity normal.     Skin:     General: Skin is warm and dry.     Neurological:      Mental Status: She is alert and oriented to person, place, and time.     Psychiatric:         Mood and Affect: Mood normal.         Behavior: Behavior normal.

## 2025-05-22 ENCOUNTER — TELEPHONE (OUTPATIENT)
Age: 65
End: 2025-05-22

## 2025-05-22 NOTE — TELEPHONE ENCOUNTER
Please complete ambulatory referral and send to Primary Care Procedure Prior Authorizations once complete so they can enter the insurance referral. Thank you!              Patient is requesting an insurance referral for the following specialty:      Test Name / Order Name:     DX Code: L57.8    Date Of Service: 6/10    Dr [facility] [place] [phone].     Location / Facility NPI: [id number]    Best Phone # To Reach The Patient:  [phone] .

## 2025-06-24 ENCOUNTER — OFFICE VISIT (OUTPATIENT)
Dept: PODIATRY | Facility: CLINIC | Age: 65
End: 2025-06-24
Payer: COMMERCIAL

## 2025-06-24 ENCOUNTER — HOSPITAL ENCOUNTER (OUTPATIENT)
Dept: MRI IMAGING | Facility: HOSPITAL | Age: 65
Discharge: HOME | End: 2025-06-24
Payer: COMMERCIAL

## 2025-06-24 VITALS — WEIGHT: 186 LBS | HEIGHT: 69 IN | BODY MASS INDEX: 27.55 KG/M2

## 2025-06-24 DIAGNOSIS — M79.671 PAIN OF RIGHT FOOT: ICD-10-CM

## 2025-06-24 DIAGNOSIS — S83.8X1D ACUTE MEDIAL MENISCAL INJURY OF RIGHT KNEE, SUBSEQUENT ENCOUNTER: ICD-10-CM

## 2025-06-24 DIAGNOSIS — G89.29 CHRONIC PAIN OF RIGHT KNEE: ICD-10-CM

## 2025-06-24 DIAGNOSIS — M67.471 GANGLION CYST OF RIGHT FOOT: Primary | ICD-10-CM

## 2025-06-24 DIAGNOSIS — M25.561 CHRONIC PAIN OF RIGHT KNEE: ICD-10-CM

## 2025-06-24 PROCEDURE — 99203 OFFICE O/P NEW LOW 30 MIN: CPT | Performed by: PODIATRIST

## 2025-06-24 PROCEDURE — 73721 MRI JNT OF LWR EXTRE W/O DYE: CPT

## 2025-06-25 ENCOUNTER — TELEPHONE (OUTPATIENT)
Age: 65
End: 2025-06-25

## 2025-06-25 ENCOUNTER — RESULTS FOLLOW-UP (OUTPATIENT)
Dept: FAMILY MEDICINE CLINIC | Facility: CLINIC | Age: 65
End: 2025-06-25

## 2025-06-25 DIAGNOSIS — M17.11 TRICOMPARTMENT OSTEOARTHRITIS OF RIGHT KNEE: ICD-10-CM

## 2025-06-25 DIAGNOSIS — S83.241A TEAR OF MEDIAL MENISCUS OF RIGHT KNEE, UNSPECIFIED TEAR TYPE, UNSPECIFIED WHETHER OLD OR CURRENT TEAR, INITIAL ENCOUNTER: Primary | ICD-10-CM

## 2025-06-25 NOTE — TELEPHONE ENCOUNTER
----- Message from GAVI Trevino sent at 6/25/2025 10:30 AM EDT -----  Toby Mckeon, Your MRI of the right knee shows a medial mensicus tear and severe osteoarthritis - recommend follow up with ortho. Do you want to go back to Dr Nunn?  ----- Message -----  From: Interface, Radiology Results In  Sent: 6/24/2025   3:47 PM EDT  To: GAVI Killian

## 2025-06-25 NOTE — TELEPHONE ENCOUNTER
Patient called she has an upcoming tooth extraction procedure and wanted to see if she needed to take a blood thinner before procedure.      Confirmed [facility] in [place].    Thank you

## 2025-06-25 NOTE — RESULT ENCOUNTER NOTE
Toby Mckeon, Your MRI of the right knee shows a medial mensicus tear and severe osteoarthritis - recommend follow up with ortho. Do you want to go back to Dr Nunn?

## 2025-06-26 NOTE — PROGRESS NOTES
Name: Linda Zimmerman      : 1960      MRN: [id number]  Encounter Provider: Manish Aguilera DPM  Encounter Date: 2025   Encounter department: [facility]  :  Assessment & Plan  Ganglion cyst of right foot  Pain of right foot  Primary care note from 2025 reviewed.  History of PE 2025 and left leg DVT , medications, and venous duplex reviewed  Treatment options for right foot probable ganglionic cyst would consist of aspiration with injection with corticosteroid or surgical excision depending on pain and discomfort.  Without any significant pain in its current state surgery is not an urgent concern.  Recommend wearing accommodative shoe gear for as long as possible, once pain gets to a limiting level then surgical correction can be pursued.  Follow-up as needed.  Orders:  •  Ambulatory Referral to Podiatry    History of Present Illness   HPI  Linda Zimmerman is a 65 y.o. female who presents lump noted just behind the right great toe joint on the bottom corner.  Present for approximately several months.  Reports no significant limiting pain just concerned about the swelling and what it might be.  History obtained from: patient    Review of Systems   Constitutional: Negative.    HENT: Negative.     Eyes: Negative.    Respiratory: Negative.     Cardiovascular: Negative.    Gastrointestinal: Negative.    Endocrine: Negative.    Genitourinary: Negative.    Musculoskeletal:         Lump just behind the right great toe joint   Skin: Negative.    Allergic/Immunologic: Negative.    Neurological: Negative.    Hematological: Negative.    Psychiatric/Behavioral: Negative.       Past Medical History   Past Medical History[1]  Past Surgical History[2]  Family History[3]   reports that she has quit smoking. She has never been exposed to tobacco smoke. She has never used smokeless tobacco. She reports current alcohol use of about 2.0 standard drinks of alcohol per week. She reports  "that she does not use drugs.  Current Outpatient Medications   Medication Instructions   • albuterol (ProAir HFA) 90 mcg/act inhaler 2 puffs, Inhalation, Every 4 hours PRN   • omeprazole (PRILOSEC) 20 mg, Oral, Daily before breakfast   • tiZANidine (ZANAFLEX) 4 mg, Oral, Daily at bedtime   Allergies[4]      Objective   Ht 5' 9\" (1.753 m) Comment: stated  Wt 84.4 kg (186 lb)   BMI 27.47 kg/m²      Physical Exam  Vitals and nursing note reviewed.   Constitutional:       General: She is not in acute distress.     Appearance: Normal appearance. She is well-developed.   HENT:      Head: Normocephalic and atraumatic.      Nose: Nose normal.     Eyes:      Conjunctiva/sclera: Conjunctivae normal.       Cardiovascular:      Rate and Rhythm: Normal rate and regular rhythm.      Pulses:           Dorsalis pedis pulses are 1+ on the right side and 1+ on the left side.        Posterior tibial pulses are 1+ on the right side and 1+ on the left side.   Pulmonary:      Effort: Pulmonary effort is normal. No respiratory distress.     Musculoskeletal:         General: Swelling, tenderness and deformity present.      Cervical back: Neck supple.      Comments:   Right foot: Plantar medial immediately proximal to the sesamoids is a well-demarcated subcutaneous mass which is firm but feels somewhat fluctuant measures 1.5 x 1.5 x 1.5 cm.  Consistent with ganglion cyst.  Do not believe it is a fibroma based on palpation clinically..   Feet:      Right foot:      Protective Sensation: 10 sites tested.  10 sites sensed.      Skin integrity: No erythema or warmth.      Toenail Condition: Right toenails are normal.      Left foot:      Protective Sensation: 10 sites tested.  10 sites sensed.      Skin integrity: No erythema or warmth.      Toenail Condition: Left toenails are normal.     Skin:     General: Skin is warm and dry.      Capillary Refill: Capillary refill takes 2 to 3 seconds.     Neurological:      General: No focal deficit " present.      Mental Status: She is alert.     Psychiatric:         Mood and Affect: Mood normal.                  [1]  Past Medical History:  Diagnosis Date   • Bilateral pulmonary embolism (HCC) 03/06/2024   • Breast cyst    • Breast lump    • Colon polyp    • Instability of knee joint    • Lateral epicondylitis    • Neoplasm of uncertain behavior of skin    [2]  Past Surgical History:  Procedure Laterality Date   • BREAST EXCISIONAL BIOPSY Left 1993    benign   • BREAST LUMPECTOMY  1991   • COLONOSCOPY     • HYSTERECTOMY      age 54   • KNEE SURGERY     • LYMPH NODE BIOPSY     • OOPHORECTOMY Bilateral     with hysterectomy, age 54   [3]  Family History  Problem Relation Name Age of Onset   • Cancer Mother Adrianne         t-cell lymphoma   • Diabetes Father Doug    • No Known Problems Sister     • No Known Problems Sister     • No Known Problems Sister     • No Known Problems Daughter     • Cancer Maternal Grandmother     • Ovarian cancer Maternal Grandmother          young, guessing 40's   • Cancer Maternal Grandfather Pop    • Endometrial cancer Paternal Grandmother Grandma         age unknown   • No Known Problems Paternal Grandfather     • No Known Problems Paternal Aunt     • No Known Problems Paternal Aunt     • Other (blood clots) Nephew  35        lungs   • Colon cancer Neg Hx     [4]  Allergies  Allergen Reactions   • Collagen (Equine) Shortness Of Breath   • Horse Protein Shortness Of Breath     Horses

## 2025-06-30 ENCOUNTER — TELEPHONE (OUTPATIENT)
Age: 65
End: 2025-06-30

## 2025-06-30 NOTE — TELEPHONE ENCOUNTER
AMB referral is already in chart, just need insurance referral for Ortho.         Patient is requesting an insurance referral for the following specialty:      Test Name / Order Name:     DX Code:     Date Of Service: 7/1/25    Location/Facility Name/Address/Phone #:   [facility]  [phone]      Location / Facility NPI:   [id number] - [facility] (they asked to please put it under this)     Best Phone # To Reach The Patient:  [phone]

## 2025-07-21 ENCOUNTER — TELEPHONE (OUTPATIENT)
Age: 65
End: 2025-07-21

## 2025-07-21 NOTE — TELEPHONE ENCOUNTER
Patient called, stating she was having a surgery on 10/6/25 at [facility] for her knee. She was needing to be placed on blood thinners before the surgery, but first available appointment is not until 12/5/25. She would like to know if Jose Alberto can place the order for that or if she does need to come in, how can it be worked out to be seen before her surgery date

## 2025-07-22 NOTE — TELEPHONE ENCOUNTER
She should not require blood thinners prior to surgery. I am not sure what type of surgery she is having, typically ortho would prescribe postoperatively. I need more info regarding surgery she is having and would need to see her if she wishes me to order AC

## 2025-07-23 ENCOUNTER — TELEPHONE (OUTPATIENT)
Age: 65
End: 2025-07-23

## 2025-07-23 NOTE — TELEPHONE ENCOUNTER
Called and spoke to the patient, and scheduled with Dr Vernon on 9/23 at 240 pm for Pre Op clearance